# Patient Record
Sex: FEMALE | Race: WHITE | NOT HISPANIC OR LATINO
[De-identification: names, ages, dates, MRNs, and addresses within clinical notes are randomized per-mention and may not be internally consistent; named-entity substitution may affect disease eponyms.]

---

## 2019-10-21 ENCOUNTER — TRANSCRIPTION ENCOUNTER (OUTPATIENT)
Age: 29
End: 2019-10-21

## 2019-10-24 ENCOUNTER — TRANSCRIPTION ENCOUNTER (OUTPATIENT)
Age: 29
End: 2019-10-24

## 2021-08-11 ENCOUNTER — TRANSCRIPTION ENCOUNTER (OUTPATIENT)
Age: 31
End: 2021-08-11

## 2021-09-15 ENCOUNTER — TRANSCRIPTION ENCOUNTER (OUTPATIENT)
Age: 31
End: 2021-09-15

## 2022-05-10 ENCOUNTER — EMERGENCY (EMERGENCY)
Facility: HOSPITAL | Age: 32
LOS: 1 days | Discharge: HOME | End: 2022-05-10
Admitting: EMERGENCY MEDICINE
Payer: COMMERCIAL

## 2022-05-10 VITALS
HEART RATE: 98 BPM | TEMPERATURE: 98 F | OXYGEN SATURATION: 97 % | RESPIRATION RATE: 18 BRPM | SYSTOLIC BLOOD PRESSURE: 118 MMHG | DIASTOLIC BLOOD PRESSURE: 85 MMHG

## 2022-05-10 VITALS
RESPIRATION RATE: 16 BRPM | SYSTOLIC BLOOD PRESSURE: 123 MMHG | OXYGEN SATURATION: 97 % | DIASTOLIC BLOOD PRESSURE: 81 MMHG | HEART RATE: 108 BPM | HEIGHT: 67 IN | TEMPERATURE: 99 F | WEIGHT: 220.02 LBS

## 2022-05-10 LAB
ALBUMIN SERPL ELPH-MCNC: 3.8 G/DL — SIGNIFICANT CHANGE UP (ref 3.4–5)
ALP SERPL-CCNC: 76 U/L — SIGNIFICANT CHANGE UP (ref 40–120)
ALT FLD-CCNC: 93 U/L — HIGH (ref 12–42)
ANION GAP SERPL CALC-SCNC: 10 MMOL/L — SIGNIFICANT CHANGE UP (ref 9–16)
APPEARANCE UR: CLEAR — SIGNIFICANT CHANGE UP
APTT BLD: 31.4 SEC — SIGNIFICANT CHANGE UP (ref 27.5–35.5)
AST SERPL-CCNC: 37 U/L — SIGNIFICANT CHANGE UP (ref 15–37)
BACTERIA # UR AUTO: PRESENT /HPF
BASOPHILS # BLD AUTO: 0.04 K/UL — SIGNIFICANT CHANGE UP (ref 0–0.2)
BASOPHILS NFR BLD AUTO: 0.4 % — SIGNIFICANT CHANGE UP (ref 0–2)
BILIRUB SERPL-MCNC: 0.4 MG/DL — SIGNIFICANT CHANGE UP (ref 0.2–1.2)
BILIRUB UR-MCNC: NEGATIVE — SIGNIFICANT CHANGE UP
BLD GP AB SCN SERPL QL: NEGATIVE — SIGNIFICANT CHANGE UP
BUN SERPL-MCNC: 8 MG/DL — SIGNIFICANT CHANGE UP (ref 7–23)
CALCIUM SERPL-MCNC: 9.2 MG/DL — SIGNIFICANT CHANGE UP (ref 8.5–10.5)
CHLORIDE SERPL-SCNC: 103 MMOL/L — SIGNIFICANT CHANGE UP (ref 96–108)
CO2 SERPL-SCNC: 23 MMOL/L — SIGNIFICANT CHANGE UP (ref 22–31)
COLOR SPEC: YELLOW — SIGNIFICANT CHANGE UP
CREAT SERPL-MCNC: 0.79 MG/DL — SIGNIFICANT CHANGE UP (ref 0.5–1.3)
DIFF PNL FLD: ABNORMAL
EGFR: 102 ML/MIN/1.73M2 — SIGNIFICANT CHANGE UP
EOSINOPHIL # BLD AUTO: 0.16 K/UL — SIGNIFICANT CHANGE UP (ref 0–0.5)
EOSINOPHIL NFR BLD AUTO: 1.6 % — SIGNIFICANT CHANGE UP (ref 0–6)
EPI CELLS # UR: ABNORMAL /HPF (ref 0–5)
GLUCOSE SERPL-MCNC: 106 MG/DL — HIGH (ref 70–99)
GLUCOSE UR QL: NEGATIVE — SIGNIFICANT CHANGE UP
HCG SERPL-ACNC: HIGH MIU/ML
HCG UR QL: POSITIVE — SIGNIFICANT CHANGE UP
HCT VFR BLD CALC: 45 % — SIGNIFICANT CHANGE UP (ref 34.5–45)
HGB BLD-MCNC: 15.2 G/DL — SIGNIFICANT CHANGE UP (ref 11.5–15.5)
IMM GRANULOCYTES NFR BLD AUTO: 0.6 % — SIGNIFICANT CHANGE UP (ref 0–1.5)
INR BLD: 0.98 — SIGNIFICANT CHANGE UP (ref 0.88–1.16)
KETONES UR-MCNC: ABNORMAL MG/DL
LEUKOCYTE ESTERASE UR-ACNC: ABNORMAL
LYMPHOCYTES # BLD AUTO: 1.76 K/UL — SIGNIFICANT CHANGE UP (ref 1–3.3)
LYMPHOCYTES # BLD AUTO: 18.1 % — SIGNIFICANT CHANGE UP (ref 13–44)
MCHC RBC-ENTMCNC: 31 PG — SIGNIFICANT CHANGE UP (ref 27–34)
MCHC RBC-ENTMCNC: 33.8 GM/DL — SIGNIFICANT CHANGE UP (ref 32–36)
MCV RBC AUTO: 91.6 FL — SIGNIFICANT CHANGE UP (ref 80–100)
MONOCYTES # BLD AUTO: 0.76 K/UL — SIGNIFICANT CHANGE UP (ref 0–0.9)
MONOCYTES NFR BLD AUTO: 7.8 % — SIGNIFICANT CHANGE UP (ref 2–14)
NEUTROPHILS # BLD AUTO: 6.92 K/UL — SIGNIFICANT CHANGE UP (ref 1.8–7.4)
NEUTROPHILS NFR BLD AUTO: 71.5 % — SIGNIFICANT CHANGE UP (ref 43–77)
NITRITE UR-MCNC: NEGATIVE — SIGNIFICANT CHANGE UP
NRBC # BLD: 0 /100 WBCS — SIGNIFICANT CHANGE UP (ref 0–0)
PH UR: 5.5 — SIGNIFICANT CHANGE UP (ref 5–8)
PLATELET # BLD AUTO: 291 K/UL — SIGNIFICANT CHANGE UP (ref 150–400)
POTASSIUM SERPL-MCNC: 3.7 MMOL/L — SIGNIFICANT CHANGE UP (ref 3.5–5.3)
POTASSIUM SERPL-SCNC: 3.7 MMOL/L — SIGNIFICANT CHANGE UP (ref 3.5–5.3)
PROT SERPL-MCNC: 8.2 G/DL — SIGNIFICANT CHANGE UP (ref 6.4–8.2)
PROT UR-MCNC: NEGATIVE MG/DL — SIGNIFICANT CHANGE UP
PROTHROM AB SERPL-ACNC: 11.5 SEC — SIGNIFICANT CHANGE UP (ref 10.5–13.4)
RBC # BLD: 4.91 M/UL — SIGNIFICANT CHANGE UP (ref 3.8–5.2)
RBC # FLD: 13 % — SIGNIFICANT CHANGE UP (ref 10.3–14.5)
RBC CASTS # UR COMP ASSIST: < 5 /HPF — SIGNIFICANT CHANGE UP
RH IG SCN BLD-IMP: POSITIVE — SIGNIFICANT CHANGE UP
RH IG SCN BLD-IMP: POSITIVE — SIGNIFICANT CHANGE UP
SODIUM SERPL-SCNC: 136 MMOL/L — SIGNIFICANT CHANGE UP (ref 132–145)
SP GR SPEC: 1.02 — SIGNIFICANT CHANGE UP (ref 1–1.03)
UROBILINOGEN FLD QL: 0.2 E.U./DL — SIGNIFICANT CHANGE UP
WBC # BLD: 9.7 K/UL — SIGNIFICANT CHANGE UP (ref 3.8–10.5)
WBC # FLD AUTO: 9.7 K/UL — SIGNIFICANT CHANGE UP (ref 3.8–10.5)
WBC UR QL: > 10 /HPF

## 2022-05-10 PROCEDURE — 99284 EMERGENCY DEPT VISIT MOD MDM: CPT

## 2022-05-10 PROCEDURE — 76817 TRANSVAGINAL US OBSTETRIC: CPT | Mod: 26

## 2022-05-10 PROCEDURE — 76801 OB US < 14 WKS SINGLE FETUS: CPT | Mod: 26

## 2022-05-10 RX ORDER — CEPHALEXIN 500 MG
1 CAPSULE ORAL
Qty: 20 | Refills: 0
Start: 2022-05-10 | End: 2022-05-14

## 2022-05-10 RX ORDER — CEPHALEXIN 500 MG
1 CAPSULE ORAL
Qty: 28 | Refills: 0
Start: 2022-05-10 | End: 2022-05-16

## 2022-05-10 RX ORDER — CEPHALEXIN 500 MG
500 CAPSULE ORAL ONCE
Refills: 0 | Status: COMPLETED | OUTPATIENT
Start: 2022-05-10 | End: 2022-05-10

## 2022-05-10 RX ORDER — METOCLOPRAMIDE HCL 10 MG
1 TABLET ORAL
Qty: 15 | Refills: 0
Start: 2022-05-10 | End: 2022-06-08

## 2022-05-10 RX ADMIN — Medication 500 MILLIGRAM(S): at 20:26

## 2022-05-10 NOTE — ED ADULT TRIAGE NOTE - CHIEF COMPLAINT QUOTE
scant vaginal bleeding (with 1 large clot) today starting at 1700, lmp 03/30/22, unconfirmed pregnancy

## 2022-05-10 NOTE — ED PROVIDER NOTE - CARE PROVIDER_API CALL
Kala Lance)  Juliocesar [facility] Obstetrics and Gynecology  [address] [id number]  Phone: [phone]  Fax: [phone]  Follow Up Time:     Sg Naylor)  Obstetrics and Gynecology  [address], [place], [place] [address]  Phone: [phone]  Fax: [phone]  Follow Up Time:     Ranjan Llanos  OBSTETRICS AND GYNECOLOGY  [address] [place] [address], [address]  [place] [address]  Phone: [phone]  Fax: [phone]  Follow Up Time:

## 2022-05-10 NOTE — ED PROVIDER NOTE - PATIENT PORTAL LINK FT
You can access the FollowMyHealth Patient Portal offered by [facility] by registering at the following website: [email or web address]. By joining [email or web address]’s FollowMyHealth portal, you will also be able to view your health information using other applications (apps) compatible with our system.

## 2022-05-10 NOTE — ED PROVIDER NOTE - CLINICAL SUMMARY MEDICAL DECISION MAKING FREE TEXT BOX
pt presents with c/o vaginal bleeding after positive home pregnancy test. denies large amount of blood but endorses small clot. will check labs, US although pt verbalizes understanding this may be too early to confirm pregnancy. pt presents with c/o vaginal bleeding after positive home pregnancy test. denies large amount of blood but endorses small clot. will check labs, US although pt verbalizes understanding this may be too early to confirm pregnancy.     signed out to night team pending hcg and US.

## 2022-05-10 NOTE — ED PROVIDER NOTE - CONSTITUTIONAL, MLM
normal... Well appearing, awake, alert, oriented to person, place, time/situation, anxious appearing, tearful

## 2022-05-10 NOTE — ED PROVIDER NOTE - OBJECTIVE STATEMENT
31yo F presents with c/o vaginal bleeding in pregnancy. LMP 3/30/22 with positive home pregnancy 10 days ago, several positive home tests. she has had slight spotting twice in the last two weeks and then today she started having heavier bleeding and passed a clot the size of a coffee bean. denies abd pain, dizziness, nausea, vomiting, chest pain, sob, headache, vision changes, any other concerning symptoms.

## 2022-05-10 NOTE — ED PROVIDER NOTE - CARE PLAN
1 Principal Discharge DX:	Threatened miscarriage  Secondary Diagnosis:	Acute UTI   Principal Discharge DX:	Threatened miscarriage  Secondary Diagnosis:	Acute UTI  Secondary Diagnosis:	Endometrioma of ovary

## 2022-05-10 NOTE — ED PROVIDER NOTE - CARE PROVIDERS DIRECT ADDRESSES
[email or web address]

## 2022-05-10 NOTE — ED PROVIDER NOTE - NSCAREINITIATED _GEN_ER
Per LOV note 5/24/17 pt was to be cleared by EP prior to starting Humira.  Nothing in EP notes on 5/30/17 to reflect clearance.  LM for pt to call back  Med set to escribed to pharmacy   -ACP Only-, .(Attending)

## 2022-05-10 NOTE — ED PROVIDER NOTE - NSFOLLOWUPINSTRUCTIONS_ED_ALL_ED_FT
Threatened Miscarriage    WHAT YOU NEED TO KNOW:    A threatened miscarriage occurs when you have vaginal bleeding within the first 20 weeks of pregnancy. It means that a miscarriage may happen. A threatened miscarriage may also be called a threatened .    DISCHARGE INSTRUCTIONS:    Seek care immediately if:   •You feel weak or faint.      •Your pain or cramping in your abdomen or back gets worse.      •You have vaginal bleeding that soaks 1 or more pads in an hour.      •You pass material that looks like tissue or large clots.      Call your doctor or obstetrician if:   •You have a fever.      •You have trouble urinating, burning when you urinate, or feel a need to urinate often.      •You have new or worsening vaginal bleeding.      •You have vaginal pain or itching, or vaginal discharge that is yellow, green, or foul-smelling.      •You have questions or concerns about your condition or care.      Self-care: The following may help you manage your symptoms and decrease your risk for a miscarriage:  •Do not put anything in your vagina. Do not have sex, douche, or use tampons. These actions may increase your risk for infection and miscarriage.      •Rest as directed. Do not exercise or do strenuous activities. These activities may cause  labor or miscarriage. Ask your healthcare provider what activities are okay to do.      Stay healthy during pregnancy:   •Eat a variety of healthy foods. Healthy foods can help you get extra protein, water, and calories that you need while you are pregnant. Healthy foods include fruits, vegetables, whole-grain breads, low-fat dairy products, beans, lean meats, and fish. Avoid raw or undercooked meat and fish. Ask your healthcare provider if you need a special diet.  Healthy Foods           •Take prenatal vitamins as directed. These help you get the right amount of vitamins and minerals. They may also decrease the risk of certain birth defects.      •Do not drink alcohol or use illegal drugs. These can increase your risk for a miscarriage or harm your baby.      •Do not smoke. Nicotine and other chemicals in cigarettes and cigars can harm your baby and cause miscarriage or  labor. Ask your healthcare provider for information if you currently smoke and need help to quit. E-cigarettes or smokeless tobacco still contain nicotine. Do not use these products.      •Decrease your risk for an infection. Always wash your hands before eating or preparing meals. Do not spend time with people who are sick. Ask your healthcare provider if you need immunizations such as the flu or hepatitis B vaccine. Immunizations may decrease your risk for infections that could cause a miscarriage.      •Manage your medical conditions. Keep your blood pressure and blood sugars under control. Maintain a healthy weight during pregnancy.      Follow up with your doctor or obstetrician as directed: You may need to see your obstetrician frequently for ultrasounds or blood tests. Write down your questions so you remember to ask them during your visits.      Ovarian Cyst    WHAT YOU NEED TO KNOW:    An ovarian cyst is a fluid-filled sac that grows in or on an ovary. You have 2 ovaries, 1 on each side of your uterus. They are small, about the shape of an almond. Ovarian cysts are common in women who have regular monthly cycles. During your monthly cycle, eggs are released from the ovaries. The cyst usually contains fluid but may sometimes have blood or tissue in it. Most ovarian cysts are harmless and go away without treatment in a few months. Some cysts can grow large, cause pain, or break open.   Female Reproductive System         DISCHARGE INSTRUCTIONS:    Call your local emergency number (911 in the ) if:   •You have severe pain with fever and vomiting.      •You have sudden, severe abdominal pain.      •You are too weak, faint, or dizzy to stand up.      •You are breathing very quickly.      Call your doctor or gynecologist if:   •Your periods are early, late, or more painful than usual.      •You have questions or concerns about your condition or care.      Medicines: You may need any of the following:   •Birth control pills may help control your monthly cycle, prevent cysts, or cause them to shrink.      •Acetaminophen decreases pain and fever. It is available without a doctor's order. Ask how much to take and how often to take it. Follow directions. Read the labels of all other medicines you are using to see if they also contain acetaminophen, or ask your doctor or pharmacist. Acetaminophen can cause liver damage if not taken correctly.       •NSAIDs, such as ibuprofen, help decrease swelling, pain, and fever. This medicine is available with or without a doctor's order. NSAIDs can cause stomach bleeding or kidney problems in certain people. If you take blood thinner medicine, always ask your healthcare provider if NSAIDs are safe for you. Always read the medicine label and follow directions.      •Prescription pain medicine may be given. Ask your healthcare provider how to take this medicine safely. Some prescription pain medicines contain acetaminophen. Do not take other medicines that contain acetaminophen without talking to your healthcare provider. Too much acetaminophen may cause liver damage. Prescription pain medicine may cause constipation. Ask your healthcare provider how to prevent or treat constipation.       •Take your medicine as directed. Contact your healthcare provider if you think your medicine is not helping or if you have side effects. Tell him or her if you are allergic to any medicine. Keep a list of the medicines, vitamins, and herbs you take. Include the amounts, and when and why you take them. Bring the list or the pill bottles to follow-up visits. Carry your medicine list with you in case of an emergency.      Manage ovarian cysts: You can manage a current cyst and help healthcare providers find future cysts early.  •Apply heat to decrease pain and cramping from a cyst. Sit in a warm bath, or place a heating pad (turned on low) on your abdomen. Do this for 15 to 20 minutes every hour for comfort.      •Get regular pelvic exams or Pap smears. This will help providers find any new ovarian cysts. Tell your healthcare provider about any unusual changes in your monthly cycle.      Follow up with your doctor or gynecologist as directed: Write down your questions so you remember to ask them during your visits.

## 2022-05-10 NOTE — ED PROVIDER NOTE - PROGRESS NOTE DETAILS
received s/o from ANNELIESE Diaz pending US and beta HCG.  US reveals Single intrauterine gestation with average ultrasound age of 6 weeks 0 days. Fetal heart rate is not visualized likely due to early gestational age. Incidental finding of 9.2 cm complex cystic mass within the right ovary, most consistent with an endometrioma. +normal flow to b/l ovaries. Pt reassessed at bedside, abd soft and NT, never had any abd or pelvic pain reported, minimal vaginal spotting currently.  AFVSS and pt non-toxic appearing, results, ddx, and f/u plans discussed in length with pt and partner at bedside, d/c'd home to close f/u with OB (private OB appt in 9 days, but also given earlier f/u with [facility]), threatened AB precautions and strict return precautions discussed, prompt return to ER for any worsening or new sx, pt verbalized understanding.

## 2022-05-10 NOTE — ED PROVIDER NOTE - PROVIDER TOKENS
PROVIDER:[TOKEN:[[id number]]],PROVIDER:[TOKEN:[[id number]]],PROVIDER:[TOKEN:[[id number]]]

## 2022-05-11 PROBLEM — Z00.00 ENCOUNTER FOR PREVENTIVE HEALTH EXAMINATION: Status: ACTIVE | Noted: 2022-05-11

## 2022-05-12 ENCOUNTER — NON-APPOINTMENT (OUTPATIENT)
Age: 32
End: 2022-05-12

## 2022-05-12 DIAGNOSIS — O23.41 UNSPECIFIED INFECTION OF URINARY TRACT IN PREGNANCY, FIRST TRIMESTER: ICD-10-CM

## 2022-05-12 DIAGNOSIS — O20.9 HEMORRHAGE IN EARLY PREGNANCY, UNSPECIFIED: ICD-10-CM

## 2022-05-12 DIAGNOSIS — Z3A.01 LESS THAN 8 WEEKS GESTATION OF PREGNANCY: ICD-10-CM

## 2022-05-12 DIAGNOSIS — O20.0 THREATENED ABORTION: ICD-10-CM

## 2022-05-12 LAB
CULTURE RESULTS: SIGNIFICANT CHANGE UP
SPECIMEN SOURCE: SIGNIFICANT CHANGE UP

## 2022-05-16 ENCOUNTER — APPOINTMENT (OUTPATIENT)
Dept: OBGYN | Facility: CLINIC | Age: 32
End: 2022-05-16
Payer: COMMERCIAL

## 2022-05-16 ENCOUNTER — TRANSCRIPTION ENCOUNTER (OUTPATIENT)
Age: 32
End: 2022-05-16

## 2022-05-16 VITALS
BODY MASS INDEX: 34.69 KG/M2 | SYSTOLIC BLOOD PRESSURE: 113 MMHG | OXYGEN SATURATION: 97 % | HEIGHT: 67 IN | DIASTOLIC BLOOD PRESSURE: 78 MMHG | WEIGHT: 221 LBS | HEART RATE: 121 BPM

## 2022-05-16 PROBLEM — Z78.9 OTHER SPECIFIED HEALTH STATUS: Chronic | Status: ACTIVE | Noted: 2022-05-10

## 2022-05-16 PROCEDURE — 76830 TRANSVAGINAL US NON-OB: CPT

## 2022-05-16 PROCEDURE — 99203 OFFICE O/P NEW LOW 30 MIN: CPT

## 2022-05-17 LAB
ABO + RH PNL BLD: NORMAL
BASOPHILS # BLD AUTO: 0.04 K/UL
BASOPHILS NFR BLD AUTO: 0.5 %
BLD GP AB SCN SERPL QL: NORMAL
EOSINOPHIL # BLD AUTO: 0.12 K/UL
EOSINOPHIL NFR BLD AUTO: 1.5 %
HCG SERPL-MCNC: ABNORMAL MIU/ML
HCT VFR BLD CALC: 47.5 %
HGB BLD-MCNC: 14.8 G/DL
IMM GRANULOCYTES NFR BLD AUTO: 0.6 %
LYMPHOCYTES # BLD AUTO: 1.53 K/UL
LYMPHOCYTES NFR BLD AUTO: 18.9 %
MAN DIFF?: NORMAL
MCHC RBC-ENTMCNC: 29.9 PG
MCHC RBC-ENTMCNC: 31.2 GM/DL
MCV RBC AUTO: 96 FL
MONOCYTES # BLD AUTO: 0.64 K/UL
MONOCYTES NFR BLD AUTO: 7.9 %
NEUTROPHILS # BLD AUTO: 5.7 K/UL
NEUTROPHILS NFR BLD AUTO: 70.6 %
PLATELET # BLD AUTO: 295 K/UL
PROGEST SERPL-MCNC: 11 NG/ML
RBC # BLD: 4.95 M/UL
RBC # FLD: 13.9 %
WBC # FLD AUTO: 8.08 K/UL

## 2022-05-17 NOTE — HISTORY OF PRESENT ILLNESS
[Normal Amount/Duration] :  normal amount and duration [Regular Cycle Intervals] : periods have been regular [Menarche Age: ____] : age at menarche was [unfilled] [Menstrual Cramps] : menstrual cramps [Currently Active] : currently active [Men] : men [Vaginal] : vaginal [No] : No [FreeTextEntry1] : 03/30/2022

## 2022-05-17 NOTE — PHYSICAL EXAM
[Chaperone Present] : A chaperone was present in the examining room during all aspects of the physical examination [Appropriately responsive] : appropriately responsive [Alert] : alert [No Acute Distress] : no acute distress [Soft] : soft [Non-tender] : non-tender [No Lesions] : no lesions [Labia Majora] : normal [Labia Minora] : normal [Normal] : normal

## 2022-05-17 NOTE — PROCEDURE
[Transvaginal OB Sonogram] : Transvaginal OB Sonogram [Intrauterine Pregnancy] : intrauterine pregnancy [Yolk Sac] : yolk sac present [Fetal Heart] : no fetal heart [FreeTextEntry1] : Yolk sac present but no FH; measures appropriate for dates \par Large right complex appearing ovarian cyst noted- ~ 9 cm x 7 cm

## 2022-05-17 NOTE — COUNSELING
[Vitamins/Supplements] : vitamins/supplements [Drugs/Alcohol] : drugs, alcohol [Lab Results] : lab results [Medication Management] : medication management

## 2022-05-17 NOTE — REVIEW OF SYSTEMS
[Fatigue] : fatigue [Vomiting] : vomiting [Nausea] : nausea [Breast Pain] : breast pain [Negative] : Heme/Lymph

## 2022-05-17 NOTE — PLAN
[FreeTextEntry1] : Ms. LILLIAN ADAIR is a 33 yo G1 at 6 weeks 5d who  presents for confirmation of pregnancy. \par - IUP visualized with yolk sac and no FH\par - Patient counseled on possibility of early pregnancy versus failing pregnancy \par - Patient to return in 1 week for repeat US to assess for FH \par -  Patient counseled to avoid alcohol and risk associated foods.  \par - Patient counseled to continue taking a prenatal vitamin. \par - HCG and progesterone sent today \par \par Plan of care discussed with patient. She is in agreement and has no additional questions or needs at this  time. \par \par

## 2022-05-23 ENCOUNTER — APPOINTMENT (OUTPATIENT)
Dept: OBGYN | Facility: CLINIC | Age: 32
End: 2022-05-23
Payer: COMMERCIAL

## 2022-05-23 ENCOUNTER — NON-APPOINTMENT (OUTPATIENT)
Age: 32
End: 2022-05-23

## 2022-05-23 VITALS
OXYGEN SATURATION: 98 % | HEART RATE: 114 BPM | SYSTOLIC BLOOD PRESSURE: 116 MMHG | WEIGHT: 222 LBS | DIASTOLIC BLOOD PRESSURE: 80 MMHG | HEIGHT: 67 IN | BODY MASS INDEX: 34.84 KG/M2

## 2022-05-23 PROCEDURE — 0502F SUBSEQUENT PRENATAL CARE: CPT

## 2022-05-23 PROCEDURE — 36415 COLL VENOUS BLD VENIPUNCTURE: CPT

## 2022-05-24 ENCOUNTER — NON-APPOINTMENT (OUTPATIENT)
Age: 32
End: 2022-05-24

## 2022-05-24 LAB
ABO + RH PNL BLD: NORMAL
ALBUMIN SERPL ELPH-MCNC: 4.8 G/DL
ALP BLD-CCNC: 85 U/L
ALT SERPL-CCNC: 33 U/L
ANION GAP SERPL CALC-SCNC: 18 MMOL/L
AST SERPL-CCNC: 25 U/L
B19V IGG SER QL IA: 0.5 INDEX
B19V IGG+IGM SER-IMP: NEGATIVE
B19V IGG+IGM SER-IMP: NORMAL
B19V IGM FLD-ACNC: 0.1 INDEX
B19V IGM SER-ACNC: NEGATIVE
BASOPHILS # BLD AUTO: 0.08 K/UL
BASOPHILS NFR BLD AUTO: 0.7 %
BILIRUB SERPL-MCNC: 0.3 MG/DL
BLD GP AB SCN SERPL QL: NORMAL
BUN SERPL-MCNC: 9 MG/DL
C TRACH RRNA SPEC QL NAA+PROBE: NOT DETECTED
CALCIUM SERPL-MCNC: 9.9 MG/DL
CHLORIDE SERPL-SCNC: 100 MMOL/L
CMV IGG SERPL QL: 5.9 U/ML
CMV IGG SERPL-IMP: POSITIVE
CMV IGM SERPL QL: <8 AU/ML
CMV IGM SERPL QL: NEGATIVE
CO2 SERPL-SCNC: 20 MMOL/L
CREAT SERPL-MCNC: 0.67 MG/DL
EGFR: 119 ML/MIN/1.73M2
EOSINOPHIL # BLD AUTO: 0.16 K/UL
EOSINOPHIL NFR BLD AUTO: 1.4 %
GLUCOSE SERPL-MCNC: 66 MG/DL
HCT VFR BLD CALC: 48.5 %
HGB A MFR BLD: 97.6 %
HGB A2 MFR BLD: 2.4 %
HGB BLD-MCNC: 15.8 G/DL
HGB FRACT BLD-IMP: NORMAL
HSV 1+2 IGG SER IA-IMP: NEGATIVE
HSV 1+2 IGG SER IA-IMP: POSITIVE
HSV1 IGG SER QL: 27.6 INDEX
HSV2 IGG SER QL: 0.13 INDEX
IMM GRANULOCYTES NFR BLD AUTO: 1 %
LYMPHOCYTES # BLD AUTO: 1.98 K/UL
LYMPHOCYTES NFR BLD AUTO: 16.8 %
MAN DIFF?: NORMAL
MCHC RBC-ENTMCNC: 30.6 PG
MCHC RBC-ENTMCNC: 32.6 GM/DL
MCV RBC AUTO: 93.8 FL
MEV IGG FLD QL IA: 41.4 AU/ML
MEV IGG+IGM SER-IMP: POSITIVE
MONOCYTES # BLD AUTO: 0.87 K/UL
MONOCYTES NFR BLD AUTO: 7.4 %
MUV AB SER-ACNC: POSITIVE
MUV IGG SER QL IA: 17.7 AU/ML
N GONORRHOEA RRNA SPEC QL NAA+PROBE: NOT DETECTED
NEUTROPHILS # BLD AUTO: 8.59 K/UL
NEUTROPHILS NFR BLD AUTO: 72.7 %
PLATELET # BLD AUTO: 314 K/UL
POTASSIUM SERPL-SCNC: 4.1 MMOL/L
PROT SERPL-MCNC: 7.9 G/DL
RBC # BLD: 5.17 M/UL
RBC # FLD: 13.3 %
RUBV IGG FLD-ACNC: 2.8 INDEX
RUBV IGG SER-IMP: POSITIVE
SODIUM SERPL-SCNC: 139 MMOL/L
SOURCE AMPLIFICATION: NORMAL
T GONDII AB SER-IMP: NEGATIVE
T GONDII AB SER-IMP: NEGATIVE
T GONDII IGG SER QL: <3 IU/ML
T GONDII IGM SER QL: <3 AU/ML
TSH SERPL-ACNC: 1.65 UIU/ML
VZV AB TITR SER: POSITIVE
VZV IGG SER IF-ACNC: 2870 INDEX
WBC # FLD AUTO: 11.8 K/UL

## 2022-05-24 NOTE — HISTORY OF PRESENT ILLNESS
[FreeTextEntry1] : Ms. Angelika Yo presents to office for follow up evaluation of pregnancy at 7 weeks 5 days. \par Patient denies any pelvic cramping but does report she had one time light spotting this morning.\par \par Patient reports nausea in which Vitamin B6 and Unisom is helping. \par \par LMP: 03/30/2022\par EGA: 7w5d\par HAMZAH: 12/07/2022

## 2022-05-24 NOTE — PROCEDURE
[Transvaginal OB Sonogram] : Transvaginal OB Sonogram [Intrauterine Pregnancy] : intrauterine pregnancy [Fetal Heart] : fetal heart present [CRL: ___ (mm)] : CRL - [unfilled]Umm [Current GA by Sonogram: ___ (wks)] : Current GA by Sonogram: [unfilled]Uwks [___ day(s)] : [unfilled] days [Transvaginal OB Sonogram WNL] : Transvaginal OB Sonogram WNL [FreeTextEntry1] : FHR: 155

## 2022-05-24 NOTE — PLAN
[FreeTextEntry1] : Pregnancy packet reviewed as well as instructions for MFM follow up. Patient to RTO in 3 weeks for NIPT. \par \par Patient to be contacted PRN regarding results and has been instructed to contact office with any vaginal bleeding and/or pelvic pain.\par \par All questions answered and patient is in agreement and understanding of plan.

## 2022-05-24 NOTE — PHYSICAL EXAM
OB [Chaperone Present] : A chaperone was present in the examining room during all aspects of the physical examination [Appropriately responsive] : appropriately responsive [Alert] : alert [No Acute Distress] : no acute distress [Oriented x3] : oriented x3 [Labia Majora] : normal [Labia Minora] : normal [Normal] : normal

## 2022-05-25 LAB
BACTERIA UR CULT: NORMAL
HBV SURFACE AG SER QL: NONREACTIVE
HCV AB SER QL: NONREACTIVE
HCV S/CO RATIO: 0.09 S/CO
HIV1+2 AB SPEC QL IA.RAPID: NONREACTIVE
RPR SER-TITR: NORMAL

## 2022-05-27 ENCOUNTER — NON-APPOINTMENT (OUTPATIENT)
Age: 32
End: 2022-05-27

## 2022-05-27 LAB — AR GENE MUT ANL BLD/T: NORMAL

## 2022-06-01 LAB — FMR1 GENE MUT ANL BLD/T: NORMAL

## 2022-06-02 ENCOUNTER — TRANSCRIPTION ENCOUNTER (OUTPATIENT)
Age: 32
End: 2022-06-02

## 2022-06-02 LAB
CFTR MUT TESTED BLD/T: NEGATIVE
HSV1 IGM SER QL: NEGATIVE
HSV2 AB FLD-ACNC: NEGATIVE

## 2022-06-06 ENCOUNTER — TRANSCRIPTION ENCOUNTER (OUTPATIENT)
Age: 32
End: 2022-06-06

## 2022-06-06 ENCOUNTER — NON-APPOINTMENT (OUTPATIENT)
Age: 32
End: 2022-06-06

## 2022-06-07 ENCOUNTER — TRANSCRIPTION ENCOUNTER (OUTPATIENT)
Age: 32
End: 2022-06-07

## 2022-06-13 ENCOUNTER — NON-APPOINTMENT (OUTPATIENT)
Age: 32
End: 2022-06-13

## 2022-06-13 ENCOUNTER — APPOINTMENT (OUTPATIENT)
Dept: OBGYN | Facility: CLINIC | Age: 32
End: 2022-06-13
Payer: COMMERCIAL

## 2022-06-13 VITALS — WEIGHT: 224 LBS | BODY MASS INDEX: 35.08 KG/M2 | DIASTOLIC BLOOD PRESSURE: 80 MMHG | SYSTOLIC BLOOD PRESSURE: 110 MMHG

## 2022-06-13 PROCEDURE — 0502F SUBSEQUENT PRENATAL CARE: CPT

## 2022-06-20 ENCOUNTER — TRANSCRIPTION ENCOUNTER (OUTPATIENT)
Age: 32
End: 2022-06-20

## 2022-06-21 ENCOUNTER — LABORATORY RESULT (OUTPATIENT)
Age: 32
End: 2022-06-21

## 2022-06-22 ENCOUNTER — APPOINTMENT (OUTPATIENT)
Dept: ANTEPARTUM | Facility: CLINIC | Age: 32
End: 2022-06-22
Payer: COMMERCIAL

## 2022-06-22 ENCOUNTER — ASOB RESULT (OUTPATIENT)
Age: 32
End: 2022-06-22

## 2022-06-22 PROCEDURE — 76801 OB US < 14 WKS SINGLE FETUS: CPT

## 2022-06-22 PROCEDURE — 76813 OB US NUCHAL MEAS 1 GEST: CPT

## 2022-06-23 ENCOUNTER — TRANSCRIPTION ENCOUNTER (OUTPATIENT)
Age: 32
End: 2022-06-23

## 2022-06-23 RX ORDER — ONDANSETRON 4 MG/1
4 TABLET ORAL 3 TIMES DAILY
Qty: 2 | Refills: 1 | Status: DISCONTINUED | COMMUNITY
Start: 2022-06-07 | End: 2022-06-23

## 2022-07-02 ENCOUNTER — NON-APPOINTMENT (OUTPATIENT)
Age: 32
End: 2022-07-02

## 2022-07-06 ENCOUNTER — TRANSCRIPTION ENCOUNTER (OUTPATIENT)
Age: 32
End: 2022-07-06

## 2022-07-11 ENCOUNTER — TRANSCRIPTION ENCOUNTER (OUTPATIENT)
Age: 32
End: 2022-07-11

## 2022-07-18 ENCOUNTER — APPOINTMENT (OUTPATIENT)
Dept: OBGYN | Facility: CLINIC | Age: 32
End: 2022-07-18

## 2022-07-18 VITALS
WEIGHT: 224 LBS | HEART RATE: 118 BPM | OXYGEN SATURATION: 97 % | SYSTOLIC BLOOD PRESSURE: 127 MMHG | DIASTOLIC BLOOD PRESSURE: 82 MMHG | BODY MASS INDEX: 35.08 KG/M2

## 2022-07-18 PROCEDURE — 0502F SUBSEQUENT PRENATAL CARE: CPT

## 2022-07-18 PROCEDURE — 36415 COLL VENOUS BLD VENIPUNCTURE: CPT

## 2022-07-20 ENCOUNTER — ASOB RESULT (OUTPATIENT)
Age: 32
End: 2022-07-20

## 2022-07-20 ENCOUNTER — APPOINTMENT (OUTPATIENT)
Dept: ANTEPARTUM | Facility: CLINIC | Age: 32
End: 2022-07-20

## 2022-07-20 PROCEDURE — 76805 OB US >/= 14 WKS SNGL FETUS: CPT

## 2022-07-20 PROCEDURE — 76817 TRANSVAGINAL US OBSTETRIC: CPT

## 2022-07-27 LAB — AFP PNL SERPL: NORMAL

## 2022-08-02 ENCOUNTER — NON-APPOINTMENT (OUTPATIENT)
Age: 32
End: 2022-08-02

## 2022-08-02 ENCOUNTER — TRANSCRIPTION ENCOUNTER (OUTPATIENT)
Age: 32
End: 2022-08-02

## 2022-08-09 ENCOUNTER — TRANSCRIPTION ENCOUNTER (OUTPATIENT)
Age: 32
End: 2022-08-09

## 2022-08-15 ENCOUNTER — APPOINTMENT (OUTPATIENT)
Dept: OBGYN | Facility: CLINIC | Age: 32
End: 2022-08-15

## 2022-08-15 VITALS
HEART RATE: 104 BPM | BODY MASS INDEX: 34.69 KG/M2 | OXYGEN SATURATION: 96 % | HEIGHT: 67 IN | DIASTOLIC BLOOD PRESSURE: 82 MMHG | WEIGHT: 221 LBS | SYSTOLIC BLOOD PRESSURE: 124 MMHG

## 2022-08-15 PROCEDURE — 0502F SUBSEQUENT PRENATAL CARE: CPT

## 2022-08-17 ENCOUNTER — TRANSCRIPTION ENCOUNTER (OUTPATIENT)
Age: 32
End: 2022-08-17

## 2022-08-24 ENCOUNTER — APPOINTMENT (OUTPATIENT)
Dept: ANTEPARTUM | Facility: CLINIC | Age: 32
End: 2022-08-24

## 2022-08-24 ENCOUNTER — TRANSCRIPTION ENCOUNTER (OUTPATIENT)
Age: 32
End: 2022-08-24

## 2022-08-24 ENCOUNTER — ASOB RESULT (OUTPATIENT)
Age: 32
End: 2022-08-24

## 2022-08-24 PROCEDURE — 76816 OB US FOLLOW-UP PER FETUS: CPT

## 2022-08-31 ENCOUNTER — TRANSCRIPTION ENCOUNTER (OUTPATIENT)
Age: 32
End: 2022-08-31

## 2022-08-31 ENCOUNTER — APPOINTMENT (OUTPATIENT)
Dept: ANTEPARTUM | Facility: CLINIC | Age: 32
End: 2022-08-31

## 2022-08-31 ENCOUNTER — ASOB RESULT (OUTPATIENT)
Age: 32
End: 2022-08-31

## 2022-08-31 PROCEDURE — 76815 OB US LIMITED FETUS(S): CPT

## 2022-09-06 ENCOUNTER — OUTPATIENT (OUTPATIENT)
Dept: OUTPATIENT SERVICES | Facility: HOSPITAL | Age: 32
LOS: 1 days | Discharge: HOME | End: 2022-09-06

## 2022-09-06 ENCOUNTER — NON-APPOINTMENT (OUTPATIENT)
Age: 32
End: 2022-09-06

## 2022-09-06 ENCOUNTER — APPOINTMENT (OUTPATIENT)
Dept: PSYCHIATRY | Facility: CLINIC | Age: 32
End: 2022-09-06

## 2022-09-06 PROCEDURE — 99205 OFFICE O/P NEW HI 60 MIN: CPT | Mod: 95

## 2022-09-12 ENCOUNTER — APPOINTMENT (OUTPATIENT)
Dept: PSYCHIATRY | Facility: CLINIC | Age: 32
End: 2022-09-12

## 2022-09-14 ENCOUNTER — TRANSCRIPTION ENCOUNTER (OUTPATIENT)
Age: 32
End: 2022-09-14

## 2022-09-14 ENCOUNTER — APPOINTMENT (OUTPATIENT)
Dept: PSYCHIATRY | Facility: CLINIC | Age: 32
End: 2022-09-14

## 2022-09-14 PROCEDURE — 99215 OFFICE O/P EST HI 40 MIN: CPT | Mod: 95

## 2022-09-16 ENCOUNTER — NON-APPOINTMENT (OUTPATIENT)
Age: 32
End: 2022-09-16

## 2022-09-19 ENCOUNTER — APPOINTMENT (OUTPATIENT)
Dept: OBGYN | Facility: CLINIC | Age: 32
End: 2022-09-19
Payer: COMMERCIAL

## 2022-09-19 VITALS
SYSTOLIC BLOOD PRESSURE: 123 MMHG | OXYGEN SATURATION: 96 % | HEIGHT: 67 IN | DIASTOLIC BLOOD PRESSURE: 84 MMHG | HEART RATE: 101 BPM | BODY MASS INDEX: 34.69 KG/M2 | WEIGHT: 221 LBS

## 2022-09-19 DIAGNOSIS — Z23 ENCOUNTER FOR IMMUNIZATION: ICD-10-CM

## 2022-09-19 PROCEDURE — 59425 ANTEPARTUM CARE ONLY: CPT

## 2022-09-19 PROCEDURE — 0502F SUBSEQUENT PRENATAL CARE: CPT

## 2022-09-20 ENCOUNTER — APPOINTMENT (OUTPATIENT)
Dept: PSYCHIATRY | Facility: CLINIC | Age: 32
End: 2022-09-20

## 2022-09-21 LAB — BACTERIA UR CULT: NORMAL

## 2022-09-27 ENCOUNTER — APPOINTMENT (OUTPATIENT)
Dept: PSYCHIATRY | Facility: CLINIC | Age: 32
End: 2022-09-27

## 2022-09-28 ENCOUNTER — APPOINTMENT (OUTPATIENT)
Dept: OBGYN | Facility: CLINIC | Age: 32
End: 2022-09-28

## 2022-09-28 PROCEDURE — 36415 COLL VENOUS BLD VENIPUNCTURE: CPT

## 2022-10-03 ENCOUNTER — NON-APPOINTMENT (OUTPATIENT)
Age: 32
End: 2022-10-03

## 2022-10-03 ENCOUNTER — TRANSCRIPTION ENCOUNTER (OUTPATIENT)
Age: 32
End: 2022-10-03

## 2022-10-03 LAB
APPEARANCE: CLEAR
BACTERIA UR CULT: NORMAL
BASOPHILS # BLD AUTO: 0.03 K/UL
BASOPHILS NFR BLD AUTO: 0.3 %
BILIRUBIN URINE: NEGATIVE
BLOOD URINE: NEGATIVE
COLOR: NORMAL
EOSINOPHIL # BLD AUTO: 0.11 K/UL
EOSINOPHIL NFR BLD AUTO: 1 %
GLUCOSE 1H P 50 G GLC PO SERPL-MCNC: 149 MG/DL
GLUCOSE QUALITATIVE U: ABNORMAL
HCT VFR BLD CALC: 39.4 %
HGB BLD-MCNC: 12.5 G/DL
IMM GRANULOCYTES NFR BLD AUTO: 0.9 %
KETONES URINE: NEGATIVE
LEUKOCYTE ESTERASE URINE: NEGATIVE
LYMPHOCYTES # BLD AUTO: 1.71 K/UL
LYMPHOCYTES NFR BLD AUTO: 15.2 %
MAN DIFF?: NORMAL
MCHC RBC-ENTMCNC: 29.5 PG
MCHC RBC-ENTMCNC: 31.7 GM/DL
MCV RBC AUTO: 92.9 FL
MONOCYTES # BLD AUTO: 0.58 K/UL
MONOCYTES NFR BLD AUTO: 5.1 %
NEUTROPHILS # BLD AUTO: 8.75 K/UL
NEUTROPHILS NFR BLD AUTO: 77.5 %
NITRITE URINE: NEGATIVE
PH URINE: 7.5
PLATELET # BLD AUTO: 366 K/UL
PROTEIN URINE: NEGATIVE
RBC # BLD: 4.24 M/UL
RBC # FLD: 14.1 %
SPECIFIC GRAVITY URINE: 1.01
T PALLIDUM AB SER QL IA: NEGATIVE
UROBILINOGEN URINE: NORMAL
WBC # FLD AUTO: 11.28 K/UL

## 2022-10-03 RX ORDER — ADHESIVE TAPE 3"X 2.3 YD
2"X2" TAPE, NON-MEDICATED TOPICAL
Qty: 3 | Refills: 4 | Status: ACTIVE | COMMUNITY
Start: 2022-10-03 | End: 1900-01-01

## 2022-10-03 RX ORDER — CONTAINER,EMPTY
EACH MISCELLANEOUS
Qty: 1 | Refills: 0 | Status: ACTIVE | COMMUNITY
Start: 2022-10-03 | End: 1900-01-01

## 2022-10-03 RX ORDER — BLOOD SUGAR DIAGNOSTIC
STRIP MISCELLANEOUS 4 TIMES DAILY
Qty: 1 | Refills: 2 | Status: ACTIVE | COMMUNITY
Start: 2022-10-03 | End: 1900-01-01

## 2022-10-03 RX ORDER — 70%ISOPROPYL ALCOHOL 0.7 ML/ML
70 SWAB TOPICAL
Qty: 1 | Refills: 0 | Status: ACTIVE | COMMUNITY
Start: 2022-10-03 | End: 1900-01-01

## 2022-10-03 RX ORDER — LANCETS
EACH MISCELLANEOUS
Qty: 1 | Refills: 2 | Status: ACTIVE | COMMUNITY
Start: 2022-10-03 | End: 1900-01-01

## 2022-10-03 RX ORDER — BLOOD-GLUCOSE METER
W/DEVICE EACH MISCELLANEOUS
Qty: 1 | Refills: 0 | Status: ACTIVE | COMMUNITY
Start: 2022-10-03 | End: 1900-01-01

## 2022-10-04 ENCOUNTER — APPOINTMENT (OUTPATIENT)
Dept: PSYCHIATRY | Facility: CLINIC | Age: 32
End: 2022-10-04

## 2022-10-04 ENCOUNTER — TRANSCRIPTION ENCOUNTER (OUTPATIENT)
Age: 32
End: 2022-10-04

## 2022-10-04 ENCOUNTER — NON-APPOINTMENT (OUTPATIENT)
Age: 32
End: 2022-10-04

## 2022-10-06 ENCOUNTER — TRANSCRIPTION ENCOUNTER (OUTPATIENT)
Age: 32
End: 2022-10-06

## 2022-10-10 ENCOUNTER — APPOINTMENT (OUTPATIENT)
Dept: OBGYN | Facility: CLINIC | Age: 32
End: 2022-10-10

## 2022-10-10 VITALS
SYSTOLIC BLOOD PRESSURE: 118 MMHG | WEIGHT: 220 LBS | DIASTOLIC BLOOD PRESSURE: 84 MMHG | OXYGEN SATURATION: 96 % | HEART RATE: 123 BPM | BODY MASS INDEX: 34.46 KG/M2

## 2022-10-10 PROCEDURE — 0502F SUBSEQUENT PRENATAL CARE: CPT

## 2022-10-10 RX ORDER — PROMETHAZINE HYDROCHLORIDE 12.5 MG/1
12.5 SUPPOSITORY RECTAL EVERY 6 HOURS
Qty: 120 | Refills: 0 | Status: DISCONTINUED | COMMUNITY
Start: 2022-09-20 | End: 2022-10-10

## 2022-10-11 ENCOUNTER — APPOINTMENT (OUTPATIENT)
Dept: MATERNAL FETAL MEDICINE | Facility: CLINIC | Age: 32
End: 2022-10-11

## 2022-10-11 ENCOUNTER — APPOINTMENT (OUTPATIENT)
Dept: PSYCHIATRY | Facility: CLINIC | Age: 32
End: 2022-10-11

## 2022-10-11 ENCOUNTER — NON-APPOINTMENT (OUTPATIENT)
Age: 32
End: 2022-10-11

## 2022-10-11 VITALS
HEIGHT: 67 IN | OXYGEN SATURATION: 97 % | BODY MASS INDEX: 34.69 KG/M2 | DIASTOLIC BLOOD PRESSURE: 76 MMHG | SYSTOLIC BLOOD PRESSURE: 111 MMHG | HEART RATE: 104 BPM | WEIGHT: 221 LBS

## 2022-10-11 DIAGNOSIS — F34.1 DYSTHYMIC DISORDER: ICD-10-CM

## 2022-10-11 PROCEDURE — 36415 COLL VENOUS BLD VENIPUNCTURE: CPT

## 2022-10-11 PROCEDURE — 99205 OFFICE O/P NEW HI 60 MIN: CPT

## 2022-10-11 NOTE — ADDENDUM
[FreeTextEntry1] : .\par .\par Case discussed in clinical supervision with Dr. Lorena Quintana, PhD [[ X]] individual [[ ]] group (Check one)\par ASSESSMENT METHOD (Check all that apply): \par [[ X]] Case presentation \par [[ ]] Review of Record/Data \par [[ ]] Direct Observation \par [[ ]] Audio Recording \par [[ ]] Video Recording \par FOCUS OF SUPERVISION (Check all that apply): \par [[ ]] Diagnosis & Assessment \par [[X ]] Intervention \par [[ ]] Professional Conduct \par [[ ]] Culture and Diversity \par [[ ]] Scholarly Inquiry   \par [[ ]] Consultation \par [[ ]] Supervision \par [[ ]] Administration/Documentation\par \par

## 2022-10-11 NOTE — PLAN
[de-identified] : Ms. Mckeon arrived to session on time.  Ms. Rodriguez discussed recent prenatal follow ups with her doctor and planning/advising specific treatment/care during pregnancy.  Ms. Rodriguez and writer also discussed anxiety symptoms as well as coping strategies/tools to support navigating and managing anxiety. Pt ended session in good behavioral and emotional control. [FreeTextEntry1] : Ms. Rodriguez will continue with weekly therapy. IAP to be reviewed 1/2023.

## 2022-10-12 LAB
25(OH)D3 SERPL-MCNC: 37.8 NG/ML
ALBUMIN SERPL ELPH-MCNC: 4.1 G/DL
ALP BLD-CCNC: 93 U/L
ALT SERPL-CCNC: 16 U/L
ANION GAP SERPL CALC-SCNC: 15 MMOL/L
AST SERPL-CCNC: 21 U/L
BASOPHILS # BLD AUTO: 0.04 K/UL
BASOPHILS NFR BLD AUTO: 0.4 %
BILIRUB SERPL-MCNC: 0.3 MG/DL
BUN SERPL-MCNC: 6 MG/DL
CALCIUM SERPL-MCNC: 9.3 MG/DL
CHLORIDE SERPL-SCNC: 103 MMOL/L
CO2 SERPL-SCNC: 19 MMOL/L
CREAT SERPL-MCNC: 0.49 MG/DL
EGFR: 128 ML/MIN/1.73M2
EOSINOPHIL # BLD AUTO: 0.11 K/UL
EOSINOPHIL NFR BLD AUTO: 1.2 %
ESTIMATED AVERAGE GLUCOSE: 108 MG/DL
FERRITIN SERPL-MCNC: 20 NG/ML
FOLATE SERPL-MCNC: >20 NG/ML
GLUCOSE SERPL-MCNC: 94 MG/DL
HBA1C MFR BLD HPLC: 5.4 %
HCT VFR BLD CALC: 38 %
HGB BLD-MCNC: 12.3 G/DL
IMM GRANULOCYTES NFR BLD AUTO: 1.4 %
LYMPHOCYTES # BLD AUTO: 1.76 K/UL
LYMPHOCYTES NFR BLD AUTO: 18.4 %
MAN DIFF?: NORMAL
MCHC RBC-ENTMCNC: 29.9 PG
MCHC RBC-ENTMCNC: 32.4 GM/DL
MCV RBC AUTO: 92.2 FL
MONOCYTES # BLD AUTO: 0.49 K/UL
MONOCYTES NFR BLD AUTO: 5.1 %
NEUTROPHILS # BLD AUTO: 7.02 K/UL
NEUTROPHILS NFR BLD AUTO: 73.5 %
PLATELET # BLD AUTO: 356 K/UL
POTASSIUM SERPL-SCNC: 4.2 MMOL/L
PROT SERPL-MCNC: 7.2 G/DL
RBC # BLD: 4.12 M/UL
RBC # FLD: 14.1 %
SODIUM SERPL-SCNC: 138 MMOL/L
TSH SERPL-ACNC: 1.44 UIU/ML
VIT B12 SERPL-MCNC: 240 PG/ML
WBC # FLD AUTO: 9.55 K/UL

## 2022-10-12 RX ORDER — METFORMIN HYDROCHLORIDE 500 MG/1
500 TABLET, COATED ORAL TWICE DAILY
Qty: 60 | Refills: 5 | Status: ACTIVE | COMMUNITY
Start: 2022-10-12 | End: 1900-01-01

## 2022-10-12 RX ORDER — METFORMIN HYDROCHLORIDE 500 MG/1
500 TABLET, FILM COATED, EXTENDED RELEASE ORAL TWICE DAILY
Qty: 60 | Refills: 5 | Status: DISCONTINUED | COMMUNITY
Start: 2022-10-11 | End: 2022-10-12

## 2022-10-13 ENCOUNTER — APPOINTMENT (OUTPATIENT)
Dept: PSYCHIATRY | Facility: CLINIC | Age: 32
End: 2022-10-13

## 2022-10-13 PROCEDURE — 99214 OFFICE O/P EST MOD 30 MIN: CPT | Mod: 95
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
## 2022-10-13 NOTE — HISTORY OF PRESENT ILLNESS
[FreeTextEntry1] : AT TIME OF INTAKE:\par Patient is a 32 year old, Cisgender, Heterosexual, Central African-American, Female born in [facility].C. Pt grew up living with her mother and step-father primarily following the separation of her mother and father at 3 years old. Pt presents to treatment with sxs of heightened anxiety, ruminations, catastrophizing, frequent crying spells, and restlessness (despite long periods of sleeping). Pt is currently 5 months pregnant and reports that anxiety sxs have become exacerbated by the pregnancy. Pt reports a history of chronic anxiety, ADHD, and episodes of depression throughout her life. Pt was in therapy "on and off" since she was 5. Pt was in therapy from [age] years old (taking zoloft and then paxil). Went to therapy in middle school [age] years old. Went to therapy from [age] years old (took Saphris, Wellbutrin, Klonopin, Xanax, Vivance, Adderall, Propanalol), After college did not take medications. Has not been in therapy again until the beginning of her pregnancy (currently taking zoloft). No hx of psychological testing. 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 According to pt report, she does not endorse any aftab or manic symptoms in her history. Towards the end of pt's high school years pt endorses suicidality preparatory behaviors and self-injurious actions (cutting). Pt does not currently endorse suicidal ideation/plan/mean/intent. Pt reports infrequent alcohol use prior to pregnancy (1-2 a year) but reports tobacco use from college until age 28, smoking a pack of cigarettes a day. Pt does not currently endorse tobacco use. Pt has no hx of hospitalizations. Pt is currently being prescribed 50mg of zoloft.\par Pt reports that the pregnancy has been difficult due to severe morning sickness, she reports being unable to work currently due to the nausea she is experiencing.\par FH: Patient's reports that her father has substance abuse issues with opioids. Pt reports that her two cousins have depression and anxiety. Pt reports no other psychiatric hx in her family.\par \par \par The appointment was provided by a clinician located remotely in [place] via: Zoom\par [X[ ]] Telemedicine using real time 2-way video technology \par [[ ]] Telephone. \par The patient was located in their private home or place of work in [place].\par \par Patient/parent/guardian stated name and date of birth to confirm identity.  Patient informed of the limits of HIPAA compliance and privacy inherent in the use of technology.  Verbal consent for remote appointment was obtained.\par \par PSYCHOSIS: N/A\par [[ ]] Paranoid ideation\par [[ ]] Ideas of reference\par [[ ]] Delusions of grandeur\par [[ ]] Thought broadcasting\par \par DISSOCIATIVE EXPERIENCES: N/A\par [[ ]] Derealization\par [[ ]] Depersonalization\par \par NEGATIVE SYMPTOMS: \par [[X ]] Apathy\par [[ ]] Anhedonia\par [[ ]] Lack of social interest\par [[X ]] Loss of motivation\par \par \par How many times have you been pregnant?  \par [ ]first pregnancy\par How many children do you have? (how many living children?)\par [ ] 0\par Does anyone in your family have a history of anxiety or depression during or after pregnancy?  \par [ ] No hx\par FOR CURRENTLY/RECENTLY PREGNANT WOMEN (WITH A BABY):\par Was this a planned pregnancy, unplanned, or something else?\par [ ] Planned pregnancy\par What changes have been made to the usual routine as a result of pregnancy (e.g. dietary changes, changes in activities or hobbies, changes to work)?\par [ ] Pt is on short term disability, is unable to work due to morning sickness. Less time for hobbies, activities. More time sleeping (10-14 hrs)\par What are/were the most pleasant aspects of pregnancy?\par [ ] The idea of 'growing a child', having a child afterwards. Buying things for the baby.\par What are/were the most unpleasant aspects of pregnancy?\par [ ] Morning sickness, the increased anxiety with the increased responsibility.\par What are the plans for feeding baby?\par [ ] Plans on breast feeding and using formula if she is unable to breastfeed.\par What are the plans for paid employment/work outside the home?\par [ ]  Pt is undecided, she will be taking a maternity leave but is considering being a stay at home.  \par Will any extended family be involved in childcare?\par [ ] Pt's in-laws will help to babysit. \par  [FreeTextEntry2] : PAST OUTPATIENT TREATMENT (PSYCHOTHERAPY, PSYCHOPHARMACOLOGY, PSYCHOLOGICAL TESTING):\par [ ]Pt was in therapy "on and off" since she was 5. Pt was in therapy from [age] years old (taking zoloft and then paxil). Went to therapy in middle school [age] years old. Went to therapy from [age] years old (took Saphris, Wellbutrin, Klonopin, Xanax, Vivance, Adderall, Propanalol), After college did not take medications. Has not been in therapy again until the beginning of her pregnancy (currently taking zoloft). No hx of psychological testing.\par PAST PSYCHIATRIC MEDICATIONS (NAMES, DOSES, DATES TAKEN, EFFECTIVENESS):\par [ ]  Does not remember doses of previous medications; currently taking 50 mg of Zoloft.\par PAST HOSPITALIZATIONS (DATES, REASON FOR ADMISSION, LENGTH OF STAY, TREATMENT, LAST PSYCHIATRIC TREATMENT):\par  [ ] No\par DATE OF LAST PHYSICAL EXAMINATION:\par [ ]No last physical exam, does not have a PCP\par .\par .\par BH MEDICATION SIDE EFFECTS: no side effects with current zoloft. Experienced weight gain with wellbutrin.\par  [No] : no [Yes > 3 months ago] : yes, > 3 months ago [Perceived burden on family or others] : perceived burden on family or others [History of abuse/trauma] : history of abuse/trauma [Other___] : [unfilled] [Responsibility to family or others] : responsibility to family or others [Identifies reasons for living] : identifies reasons for living [Future oriented] : future oriented [Supportive social network or family] : supportive social network or family [Positive therapeutic relationships] : positive therapeutic relationships [TextBox_52] : Pt was in high school and was engaging in cutting behaviors. Pt felt she did not have control of her life at the time. Preparatory behaviors included writing a letter and getting pills ready (end of high school, beginning of college). [Yes] : yes [None Known] : none known [Residential stability] : residential stability [Relationship stability] : relationship stability [Engagement in treatment] : engagement in treatment [Insight into violence risk and need for management/ treatment] : insight into violence risk and need for management/ treatment

## 2022-10-13 NOTE — RISK ASSESSMENT
[No, patient denies ideation or behavior] : No, patient denies ideation or behavior [FreeTextEntry9] : Low acute risk of harm to self/others at this time.\par \par  Low acute risk of self harm, despite anxiety.  Protected by responsibilities to family, support network, in therapy, reasons for living, future-oriented ,motivated for treatment .  Denies si/hi/ah/vh, future oriented, euthumic affect.

## 2022-10-13 NOTE — REASON FOR VISIT
[FreeTextEntry1] : medication management [Home] : at home, [unfilled] , at the time of the visit. [Other Location: e.g. Home (Enter Location, City,State)___] : at [unfilled] [Other:____] : [unfilled] [Patient] : the patient

## 2022-10-13 NOTE — DISCUSSION/SUMMARY
[FreeTextEntry1] : 32 year old cis-gendered, heterosexual Latvian American female, lives with her  in [place], works as  at [employer], currently on leave 2’ being 23 wks pregnant, due 2023, PPH of depression, anxiety, ADHD, h/o cutting in high school, in therapy since age 5 on and off, numerous medication trials, specifically Zoloft and paxil for anxiety, restarted on Zoloft 50mg at beginning of pregnancy for anxiety, denies substance abuse, h/o smoking, \par Referred by OBGYN, presents for assessment and treatment of increased anxiety during pregnancy.\par Pt found out she was pregnant in 2022.  This is her first pregnancy, no children, no FH of  depression or anxiety, planned pregnancy, on ST disability secondary to morning sickness, happy to be growing a child. Plans to breastfeed.  Unsure if she will cont to work after pregnancy.  In-laws will help with childcare. Last gyn exam was 2 wks.  LMP 3/30, FMP age 14.  \par She describes feelings of apathy, amotivation, catastrophizing, frequent crying spells, excessive worries, ruminations, restlessness, and guilt about fears of negatively impacting her pregnancy.  She states anxiety comes daily but is not constant.  No panic attacks, last PA was 4 years ago.  Denies SI/HI/AH/VH.  Denies h/o manic episodes, denies hypersexuality, increased spending, decreased need for sleep, impulsive behaviors.  Presents with euthymic mood.   \par PPH – SIB (cutting) in high school, has written suicide letters and had pills ready when in high school.  Since then no attempts, preparatory acts or SIB.  Aggressive behaviors in middle school, states didn’t know how to show affection or love at that time.  No aggressive behaviors since then.  Therapy since age 5, from [age] longest, on Zoloft, then Paxil for anxiety.  From [age], -, numerous medication trials including Sapphris, Wellbutrin (weight gain), Klonopin, Xanax, Vyvanse, Adderall (weight loss), propranolol, Paxil, Zoloft.  Has been treated for anxiety, depression, ADHD ( based off of restlessness, low concentration, energy, and trouble sleeping) and bipolar dx (based off “slut phase at age 20 - One prior provider suggested she may have bipolar disorder in the past.  Patient denies s/s of prior manic episodes and states she doesn’t think she has it.) No meds since age 25.  Restarted therapy at beginning of pregnancy.  Denies past psychiatric hospitalizations.  \par SUBS – alcohol once a year, none during pregnancy, h/o smoking age [age], pack a day, not smoking now.\par MEDS- Zoloft 50mg daily since 2022 during pregnancy, prescribed by OBGYN.  No side effects.  \par ALL- soy\par FH – coiusin with anxiety/depression, no FH of suicide, biological father abused opiates.\par SH- lives with , college – BS in [employer] science, identifies as Mu-ism  American, raised in [place], no developmental delays, public school, As & Bs, no learning disabilities noted, parents  when she was 3years old, moved around a lot with various family members, stepfather involved in life, close to him, limited interactions with her biological father, childhood neglect when with her father, mother was emotionally critical, close to her maternal extended family, close to cousin.   is most supportive relationship she’s ever had.  Losses – parental divorce, loss of childhood friend at age 9, loss of grandparent at age 19, grandmother last year from dementia. 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 Protected by responsibilities to family, support network, in therapy, reasons for living, future-oriented ,motivated for treatment .  Denies si/hi/ah/vh, future oriented, euthumic affect.\par DDX – anxiety, , r/o bipolar d/o\par PLAN – wants to transition care to [facility] treatment team\par Patient gives verbal consent to speak with patient’s . Nam Rodriguez [phone],  less than a year, known him for 7.5 years.\par Anxiety is much more manageable.  \par pt admitted to clinic - \par CLINIC COURSE:  [facility].  She began treatment with therapist.  She has been taking sertraline 50mg qhs with good effect since Aug 2022 and would like to continue it going forward.  RBA of medications discussed with patient. Describes anxiety as improving with brief, infrequent episodes of anxiety.  Denies SI/HI/AH/VH. No evidence of aftab or psychosis.  \par \par PLAN:\par - Supportive therapy provided\par - continue individual PMAD therapy weekly\par - Continue Zoloft 50mg daily\par - Pt aware of emergency resources as needed

## 2022-10-13 NOTE — PHYSICAL EXAM
[None] : none [Cooperative] : cooperative [Euthymic] : euthymic [Full] : full [Clear] : clear [Linear/Goal Directed] : linear/goal directed [Average] : average [WNL] : within normal limits

## 2022-10-13 NOTE — PAST MEDICAL HISTORY
[FreeTextEntry1] : \par NAME OF GP/PCP:  [ ] No PCP\par DATE OF LAST GYN EXAM:  [ ] two weeks prior to current visit\par FIRST MENSTRUAL PERIOD: [ ] 14 years old\par LAST MENSTRUAL PERIOD:  [ ] March 30th 2022\par

## 2022-10-13 NOTE — FAMILY HISTORY
[FreeTextEntry1] : PSYCHIATRIC ILLNESS:\par [ ] Cousins with anxiety/depression\par SUICIDE:\par [ ] No family history\par SUBSTANCE ABUSE:\par  [ ]Biological father abused opioids.

## 2022-10-17 ENCOUNTER — APPOINTMENT (OUTPATIENT)
Dept: MATERNAL FETAL MEDICINE | Facility: CLINIC | Age: 32
End: 2022-10-17

## 2022-10-17 ENCOUNTER — ASOB RESULT (OUTPATIENT)
Age: 32
End: 2022-10-17

## 2022-10-17 ENCOUNTER — APPOINTMENT (OUTPATIENT)
Dept: ANTEPARTUM | Facility: CLINIC | Age: 32
End: 2022-10-17

## 2022-10-17 PROCEDURE — 76819 FETAL BIOPHYS PROFIL W/O NST: CPT | Mod: 59

## 2022-10-17 PROCEDURE — 76816 OB US FOLLOW-UP PER FETUS: CPT

## 2022-10-17 NOTE — HISTORY OF PRESENT ILLNESS
[FreeTextEntry1] : Maternal-Fetal Medicine consultation \par Prenatal care: Dr. Clark\par \par Chief compliant: Nausea and possible GDM\par \par LILLIAN MORALES is a 32 year  at 27w6d (HAMZAH 23) that presents for a MFM consultation. She completed the one hour GCT which was abnormal, 149. However, due to persistent n/v she was unable to complete the 3 hour gTT and has been performing fingerstick monitoring.\par \par Fastings: 100 to 110s\par Postprandial: Vast majority < 140\par \par Medical history notable for generalized anxiety, followed at [facility]. Surgical history is otherwise unremarkable (wisdom teeth removal). Gynecologic history is unremarkable.\par \par Current medications include Zoloft 50 mg daily, metoclopramide 10mg TID, prenatal vitamins, stool softner, ASA 81 mg, B6/unisom, prilosec. \par Allergies: NKDA\par \par She is not working currently (previously  now out on disability). She lives with her , Vincent. Prior tobacco use, quit ~3 years ago. She denies alcohol or drug use in this pregnancy.  She has never received blood products but is willing to receive them if needed. \par \par Family history is notable for colon ca in her father (estranged, history not well known). Mother may have preDM and pre-glaucoma. No siblings. She denies a family history of birth defects, mental retardation, developmental delay or genetic disorders. Denies family history of obstetric complications such as preeclampsia, stillbirth or  delivery.\par \par Height: 5'7" Prepregnancy weight: ~221# (current weight, 221#)

## 2022-10-17 NOTE — DISCUSSION/SUMMARY
[FreeTextEntry1] : 1. GDM\par Maintaining good glycemic control is the key intervention for reducing the frequency and/or severity of complications related to gestational diabetes mellitus (GDM). We reviewed the risks associated with GDM including macrosomia/LGA infant, preeclampsia, polyhydramnios, stillbirth and  morbidities (such as hypoglycemia, hyperbilirubinemia, electrolyte abnormalities, polycythemia, respiratory distress and cardiomyopathy). Risks associated with GDM beyond the pregnancy and  period were also briefly discussed including development of obesity, impaired glucose tolerance, or metabolic syndrome. Fortunately, with good glucose control in pregnancy all these risks can be dramatically reduced.\par \par GDM is also a strong marker for maternal development of type 2 diabetes. She was counseled that lifestyle interventions, which she is already instituting, such as dietary changes and exercise can reduce her lifetime risk of DM. Exercise in pregnancy was encouraged, and follow-up with the nutritionist as needed.\par \par Based on the glucose log, medical therapy was recommended. Risks, benefits and alternative treatments were reviewed, we reviewed that insulin is generally considered the treatment of choice. However, given isolated and persistent mild fasting elevations and issues with PO intake due to n/v favor starting with metformin . She agrees with the plan.\par \par 2. Obesity\par Management and risks reviewed. Though Angelika has had minimal weight gain in the pregnancy, there is data to suggest this may optimize pregnancy outcomes given the prepregnancy weight. Continued fetal growth surveillance suggested.\par \par 3. Nausea and vomiting in pregnancy\par Reviewed treatment strategies which appear optimized. Management reviewed.\par \par 4. GEORGE with SSRI use in pregnancy\par Risks and management reviewed. Sx appear well controlled with SSRI, continue current treatment including behavioral health f/u.\par \par Recommendations:\par 1. Start metformin 500 mg daily. Side effect reviewed\par 2. Follow-up in ultrasound unit next week\par 3. Check blood work

## 2022-10-18 ENCOUNTER — APPOINTMENT (OUTPATIENT)
Dept: PSYCHIATRY | Facility: CLINIC | Age: 32
End: 2022-10-18

## 2022-10-19 ENCOUNTER — NON-APPOINTMENT (OUTPATIENT)
Age: 32
End: 2022-10-19

## 2022-10-19 ENCOUNTER — OUTPATIENT (OUTPATIENT)
Dept: OUTPATIENT SERVICES | Facility: HOSPITAL | Age: 32
LOS: 1 days | End: 2022-10-19
Payer: COMMERCIAL

## 2022-10-19 DIAGNOSIS — O26.899 OTHER SPECIFIED PREGNANCY RELATED CONDITIONS, UNSPECIFIED TRIMESTER: ICD-10-CM

## 2022-10-19 DIAGNOSIS — Z3A.00 WEEKS OF GESTATION OF PREGNANCY NOT SPECIFIED: ICD-10-CM

## 2022-10-19 LAB — GLUCOSE BLDC GLUCOMTR-MCNC: 114 MG/DL — HIGH (ref 70–99)

## 2022-10-19 PROCEDURE — 99214 OFFICE O/P EST MOD 30 MIN: CPT

## 2022-10-19 PROCEDURE — 82962 GLUCOSE BLOOD TEST: CPT

## 2022-10-20 NOTE — ED ADULT NURSE NOTE - PRIMARY CARE PROVIDER
Kristie Perrin  Phone: [phone]   Fax: [phone]          Physical Therapy Treatment Note/ Progress Report:     Date:  10/20/2022    Patient Name:  Pedro Oh    :  1985  MRN: [id number]  [id number]  URL: [email or web address]  [id number])   [] EVAL - MOD (76127)  [] EVAL - HIGH (26133)  [] RE-EVAL (40760)  [x] FT(54133) x 1    [] Ionto  [x] NMR (75154) x1 [] Vaso  [x] Manual (82902) x 1      [] Ultrasound  [] TA x   1     [] Mech Traction (98832)  [] Aquatic Therapy x     [] ES (un) (81544):   [] Home Management Training x  [] ES(attended) (14475)   [] Dry Needling 1-2 muscles (74678):  [] Dry Needling 3+ muscles (799303  [] pmd

## 2022-10-20 NOTE — REASON FOR VISIT
[Patient] : Patient [Telehealth (audio & video) - Individual/Group] : This visit was provided via telehealth using real-time 2-way audio visual technology. [Other Location: e.g. Home (Enter Location, City,State)___] : The provider was located at [unfilled]. [Home] : The patient, [unfilled], was located at home, [unfilled], at the time of the visit. [Verbal consent obtained from patient/other participant(s)] : Verbal consent for telehealth/telephonic services obtained from patient/other participant(s) [FreeTextEntry1] : Pt arrived for weekly psychotherapy appointment

## 2022-10-20 NOTE — PLAN
[Integrative Therapy] : Integrative Therapy  [Psychodynamic Therapy] : Psychodynamic Therapy  [Recommended Frequency of Visits: ____] : Recommended frequency of visits: [unfilled] [Return in ____ week(s)] : Return in [unfilled] week(s) [FreeTextEntry2] : Goal A. Reduce symptoms of anxiety during pregnancy\par Objective A. Pt will utilize coping skills and strategies to more effectively manage anxiety. [de-identified] : Ms. Mckeon arrived to session on time.  Ms. Rodriguez reported good mood. Writer started the process of a clinical semistructured interview to better assess and understand Ms. Rodriguez's current relationships as well as early childhood relationships with significant others, including caregivers. Pt ended session in good behavioral and emotional control. [FreeTextEntry1] : Ms. Rodriguez will continue with weekly therapy. IAP to be reviewed 1/2023.

## 2022-10-24 ENCOUNTER — APPOINTMENT (OUTPATIENT)
Dept: OBGYN | Facility: CLINIC | Age: 32
End: 2022-10-24

## 2022-10-24 VITALS
OXYGEN SATURATION: 95 % | DIASTOLIC BLOOD PRESSURE: 75 MMHG | SYSTOLIC BLOOD PRESSURE: 105 MMHG | WEIGHT: 221.5 LBS | BODY MASS INDEX: 34.69 KG/M2

## 2022-10-24 PROCEDURE — 0502F SUBSEQUENT PRENATAL CARE: CPT

## 2022-10-25 ENCOUNTER — APPOINTMENT (OUTPATIENT)
Dept: PSYCHIATRY | Facility: CLINIC | Age: 32
End: 2022-10-25

## 2022-11-01 ENCOUNTER — APPOINTMENT (OUTPATIENT)
Dept: PSYCHIATRY | Facility: CLINIC | Age: 32
End: 2022-11-01

## 2022-11-01 PROCEDURE — 99214 OFFICE O/P EST MOD 30 MIN: CPT | Mod: 95

## 2022-11-02 ENCOUNTER — APPOINTMENT (OUTPATIENT)
Dept: ANTEPARTUM | Facility: CLINIC | Age: 32
End: 2022-11-02

## 2022-11-02 ENCOUNTER — ASOB RESULT (OUTPATIENT)
Age: 32
End: 2022-11-02

## 2022-11-02 PROCEDURE — 76819 FETAL BIOPHYS PROFIL W/O NST: CPT

## 2022-11-02 NOTE — REASON FOR VISIT
[Home] : at home, [unfilled] , at the time of the visit. [Other Location: e.g. Home (Enter Location, City,State)___] : at [unfilled] [Other:____] : [unfilled] [Patient] : the patient [FreeTextEntry1] : medication management
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
## 2022-11-02 NOTE — HISTORY OF PRESENT ILLNESS
[No] : no [Yes > 3 months ago] : yes, > 3 months ago [Perceived burden on family or others] : perceived burden on family or others [History of abuse/trauma] : history of abuse/trauma [Other___] : [unfilled] [Responsibility to family or others] : responsibility to family or others [Identifies reasons for living] : identifies reasons for living [Future oriented] : future oriented [Supportive social network or family] : supportive social network or family [Positive therapeutic relationships] : positive therapeutic relationships [Yes] : yes [None Known] : none known [Residential stability] : residential stability [Relationship stability] : relationship stability [Engagement in treatment] : engagement in treatment [Insight into violence risk and need for management/ treatment] : insight into violence risk and need for management/ treatment [FreeTextEntry1] : AT TIME OF INTAKE:\par Patient is a 32 year old, Cisgender, Heterosexual, [place]er-American, Female born in [facility]. Pt grew up living with her mother and step-father primarily following the separation of her mother and father at 3 years old. Pt presents to treatment with sxs of heightened anxiety, ruminations, catastrophizing, frequent crying spells, and restlessness (despite long periods of sleeping). Pt is currently 5 months pregnant and reports that anxiety sxs have become exacerbated by the pregnancy. Pt reports a history of chronic anxiety, ADHD, and episodes of depression throughout her life. Pt was in therapy "on and off" since she was 5. Pt was in therapy from [age] years old (taking zoloft and then paxil). Went to therapy in middle school [age] years old. Went to therapy from [age] years old (took Saphris, Wellbutrin, Klonopin, Xanax, Vivance, Adderall, Propanalol), After college did not take medications. Has not been in therapy again until the beginning of her pregnancy (currently taking zoloft). No hx of psychological testing. 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 According to pt report, she does not endorse any aftab or manic symptoms in her history. Towards the end of pt's high school years pt endorses suicidality preparatory behaviors and self-injurious actions (cutting). Pt does not currently endorse suicidal ideation/plan/mean/intent. Pt reports infrequent alcohol use prior to pregnancy (1-2 a year) but reports tobacco use from college until age 28, smoking a pack of cigarettes a day. Pt does not currently endorse tobacco use. Pt has no hx of hospitalizations. Pt is currently being prescribed 50mg of zoloft.\par Pt reports that the pregnancy has been difficult due to severe morning sickness, she reports being unable to work currently due to the nausea she is experiencing.\par FH: Patient's reports that her father has substance abuse issues with opioids. Pt reports that her two cousins have depression and anxiety. Pt reports no other psychiatric hx in her family.\par \par \par PSYCHOSIS: N/A\par [[ ]] Paranoid ideation\par [[ ]] Ideas of reference\par [[ ]] Delusions of grandeur\par [[ ]] Thought broadcasting\par \par DISSOCIATIVE EXPERIENCES: N/A\par [[ ]] Derealization\par [[ ]] Depersonalization\par \par NEGATIVE SYMPTOMS: \par [[X ]] Apathy\par [[ ]] Anhedonia\par [[ ]] Lack of social interest\par [[X ]] Loss of motivation\par \par \par How many times have you been pregnant?  \par [ ]first pregnancy\par How many children do you have? (how many living children?)\par [ ] 0\par Does anyone in your family have a history of anxiety or depression during or after pregnancy?  \par [ ] No hx\par FOR CURRENTLY/RECENTLY PREGNANT WOMEN (WITH A BABY):\par Was this a planned pregnancy, unplanned, or something else?\par [ ] Planned pregnancy\par What changes have been made to the usual routine as a result of pregnancy (e.g. dietary changes, changes in activities or hobbies, changes to work)?\par [ ] Pt is on short term disability, is unable to work due to morning sickness. Less time for hobbies, activities. More time sleeping (10-14 hrs)\par What are/were the most pleasant aspects of pregnancy?\par [ ] The idea of 'growing a child', having a child afterwards. Buying things for the baby.\par What are/were the most unpleasant aspects of pregnancy?\par [ ] Morning sickness, the increased anxiety with the increased responsibility.\par What are the plans for feeding baby?\par [ ] Plans on breast feeding and using formula if she is unable to breastfeed.\par What are the plans for paid employment/work outside the home?\par [ ]  Pt is undecided, she will be taking a maternity leave but is considering being a stay at home.  \par Will any extended family be involved in childcare?\par [ ] Pt's in-laws will help to babysit. \par  [FreeTextEntry2] : PAST OUTPATIENT TREATMENT (PSYCHOTHERAPY, PSYCHOPHARMACOLOGY, PSYCHOLOGICAL TESTING):\par [ ]Pt was in therapy "on and off" since she was 5. Pt was in therapy from [age] years old (taking zoloft and then paxil). Went to therapy in middle school [age] years old. Went to therapy from [age] years old (took Saphris, Wellbutrin, Klonopin, Xanax, Vivance, Adderall, Propanalol), After college did not take medications. Has not been in therapy again until the beginning of her pregnancy (currently taking zoloft). No hx of psychological testing.\par PAST PSYCHIATRIC MEDICATIONS (NAMES, DOSES, DATES TAKEN, EFFECTIVENESS):\par [ ]  Does not remember doses of previous medications; currently taking 50 mg of Zoloft.\par PAST HOSPITALIZATIONS (DATES, REASON FOR ADMISSION, LENGTH OF STAY, TREATMENT, LAST PSYCHIATRIC TREATMENT):\par  [ ] No\par DATE OF LAST PHYSICAL EXAMINATION:\par [ ]No last physical exam, does not have a PCP\par .\par .\par BH MEDICATION SIDE EFFECTS: no side effects with current zoloft. Experienced weight gain with wellbutrin.\par  [TextBox_52] : Pt was in high school and was engaging in cutting behaviors. Pt felt she did not have control of her life at the time. Preparatory behaviors included writing a letter and getting pills ready (end of high school, beginning of college).

## 2022-11-02 NOTE — DISCUSSION/SUMMARY
[FreeTextEntry1] : 32 year old cis-gendered, heterosexual Arabic American female, lives with her  in [place], works as  at [employer], currently on leave 2’ being 23 wks pregnant, due 2023, PPH of depression, anxiety, ADHD, h/o cutting in high school, in therapy since age 5 on and off, numerous medication trials, specifically Zoloft and paxil for anxiety, restarted on Zoloft 50mg at beginning of pregnancy for anxiety, denies substance abuse, h/o smoking, \par Referred by OBGYN, presents for assessment and treatment of increased anxiety during pregnancy.\par Pt found out she was pregnant in 2022.  This is her first pregnancy, no children, no FH of  depression or anxiety, planned pregnancy, on ST disability secondary to morning sickness, happy to be growing a child. Plans to breastfeed.  Unsure if she will cont to work after pregnancy.  In-laws will help with childcare. Last gyn exam was 2 wks.  LMP 3/30, FMP age 14.  \par She describes feelings of apathy, amotivation, catastrophizing, frequent crying spells, excessive worries, ruminations, restlessness, and guilt about fears of negatively impacting her pregnancy.  She states anxiety comes daily but is not constant.  No panic attacks, last PA was 4 years ago.  Denies SI/HI/AH/VH.  Denies h/o manic episodes, denies hypersexuality, increased spending, decreased need for sleep, impulsive behaviors.  Presents with euthymic mood.   \par PPH – SIB (cutting) in high school, has written suicide letters and had pills ready when in high school.  Since then no attempts, preparatory acts or SIB.  Aggressive behaviors in middle school, states didn’t know how to show affection or love at that time.  No aggressive behaviors since then.  Therapy since age 5, from [age] longest, on Zoloft, then Paxil for anxiety.  From [age], -, numerous medication trials including Sapphris, Wellbutrin (weight gain), Klonopin, Xanax, Vyvanse, Adderall (weight loss), propranolol, Paxil, Zoloft.  Has been treated for anxiety, depression, ADHD ( based off of restlessness, low concentration, energy, and trouble sleeping) and bipolar dx (based off “slut phase at age 20 - One prior provider suggested she may have bipolar disorder in the past.  Patient denies s/s of prior manic episodes and states she doesn’t think she has it.) No meds since age 25.  Restarted therapy at beginning of pregnancy.  Denies past psychiatric hospitalizations.  \par SUBS – alcohol once a year, none during pregnancy, h/o smoking age [age], pack a day, not smoking now.\par MEDS- Zoloft 50mg daily since 2022 during pregnancy, prescribed by OBGYN.  No side effects.  \par ALL- soy\par FH – coiusin with anxiety/depression, no FH of suicide, biological father abused opiates.\par SH- lives with , college – BS in [employer] science, identifies as Sikh  American, raised in [place], no developmental delays, public school, As & Bs, no learning disabilities noted, parents  when she was 3years old, moved around a lot with various family members, stepfather involved in life, close to him, limited interactions with her biological father, childhood neglect when with her father, mother was emotionally critical, close to her maternal extended family, close to cousin.   is most supportive relationship she’s ever had.  Losses – parental divorce, loss of childhood friend at age 9, loss of grandparent at age 19, grandmother last year from dementia. 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 Protected by responsibilities to family, support network, in therapy, reasons for living, future-oriented ,motivated for treatment .  Denies si/hi/ah/vh, future oriented, euthumic affect.\par DDX – anxiety, , r/o bipolar d/o\par PLAN – wants to transition care to [facility] treatment team\par Patient gives verbal consent to speak with patient’s . Nam Rodriguez [phone],  less than a year, known him for 7.5 years.\par Anxiety is much more manageable.  \par pt admitted to clinic - \par CLINIC COURSE:  [facility]  She began treatment with therapist.  She has been taking sertraline 50mg qhs with good effect since Aug 2022 and would like to continue it going forward.  RBA of medications discussed with patient. Describes anxiety as improving with brief, infrequent episodes of anxiety.  Denies SI/HI/AH/VH. No evidence of aftab or psychosis.  \par \par PLAN:\par - Supportive therapy provided\par - continue individual PMAD therapy weekly\par - Continue Zoloft 50mg daily\par - Pt aware of emergency resources as needed

## 2022-11-02 NOTE — PLAN
[Integrative Therapy] : Integrative Therapy  [Psychodynamic Therapy] : Psychodynamic Therapy  [Recommended Frequency of Visits: ____] : Recommended frequency of visits: [unfilled] [Return in ____ week(s)] : Return in [unfilled] week(s) [FreeTextEntry2] : Goal A. Reduce symptoms of anxiety during pregnancy\par Objective A. Pt will utilize coping skills and strategies to more effectively manage anxiety. [de-identified] : Ms. Rodriguez arrived to session on time.  Ms. Rodriguez reported feeling more tired. Ms. Rodriguez discussed difficulty within the last week receiving a gestational diabetes diagnosis and feeling temporarily disconnected from her pregnancy. Writer worked with Ms. Castillo to understand the emotional experiences related to pregnancy while validating all emotions that may arise over the course of her pregnancy. Pt ended session in good behavioral and emotional control. [FreeTextEntry1] : Ms. Rodriguez will continue with weekly therapy. IAP to be reviewed 1/2023.

## 2022-11-08 ENCOUNTER — APPOINTMENT (OUTPATIENT)
Dept: PSYCHIATRY | Facility: CLINIC | Age: 32
End: 2022-11-08

## 2022-11-09 ENCOUNTER — OUTPATIENT (OUTPATIENT)
Dept: OUTPATIENT SERVICES | Facility: HOSPITAL | Age: 32
LOS: 1 days | End: 2022-11-09
Payer: COMMERCIAL

## 2022-11-09 ENCOUNTER — TRANSCRIPTION ENCOUNTER (OUTPATIENT)
Age: 32
End: 2022-11-09

## 2022-11-09 ENCOUNTER — APPOINTMENT (OUTPATIENT)
Dept: ANTEPARTUM | Facility: CLINIC | Age: 32
End: 2022-11-09

## 2022-11-09 ENCOUNTER — NON-APPOINTMENT (OUTPATIENT)
Age: 32
End: 2022-11-09

## 2022-11-09 DIAGNOSIS — O26.899 OTHER SPECIFIED PREGNANCY RELATED CONDITIONS, UNSPECIFIED TRIMESTER: ICD-10-CM

## 2022-11-09 DIAGNOSIS — Z3A.00 WEEKS OF GESTATION OF PREGNANCY NOT SPECIFIED: ICD-10-CM

## 2022-11-09 LAB
ALBUMIN SERPL ELPH-MCNC: 3.7 G/DL — SIGNIFICANT CHANGE UP (ref 3.3–5)
ALP SERPL-CCNC: 104 U/L — SIGNIFICANT CHANGE UP (ref 40–120)
ALT FLD-CCNC: 11 U/L — SIGNIFICANT CHANGE UP (ref 10–45)
ANION GAP SERPL CALC-SCNC: 12 MMOL/L — SIGNIFICANT CHANGE UP (ref 5–17)
APTT BLD: 27.3 SEC — LOW (ref 27.5–35.5)
AST SERPL-CCNC: 16 U/L — SIGNIFICANT CHANGE UP (ref 10–40)
BASOPHILS # BLD AUTO: 0.04 K/UL — SIGNIFICANT CHANGE UP (ref 0–0.2)
BASOPHILS NFR BLD AUTO: 0.3 % — SIGNIFICANT CHANGE UP (ref 0–2)
BILIRUB SERPL-MCNC: 0.3 MG/DL — SIGNIFICANT CHANGE UP (ref 0.2–1.2)
BUN SERPL-MCNC: 8 MG/DL — SIGNIFICANT CHANGE UP (ref 7–23)
CALCIUM SERPL-MCNC: 9.7 MG/DL — SIGNIFICANT CHANGE UP (ref 8.4–10.5)
CHLORIDE SERPL-SCNC: 101 MMOL/L — SIGNIFICANT CHANGE UP (ref 96–108)
CO2 SERPL-SCNC: 22 MMOL/L — SIGNIFICANT CHANGE UP (ref 22–31)
CREAT ?TM UR-MCNC: 390 MG/DL — SIGNIFICANT CHANGE UP
CREAT SERPL-MCNC: 0.59 MG/DL — SIGNIFICANT CHANGE UP (ref 0.5–1.3)
EGFR: 123 ML/MIN/1.73M2 — SIGNIFICANT CHANGE UP
EOSINOPHIL # BLD AUTO: 0.16 K/UL — SIGNIFICANT CHANGE UP (ref 0–0.5)
EOSINOPHIL NFR BLD AUTO: 1.4 % — SIGNIFICANT CHANGE UP (ref 0–6)
FIBRINOGEN PPP-MCNC: 784 MG/DL — HIGH (ref 258–438)
GLUCOSE BLDC GLUCOMTR-MCNC: 100 MG/DL — HIGH (ref 70–99)
GLUCOSE SERPL-MCNC: 137 MG/DL — HIGH (ref 70–99)
HCT VFR BLD CALC: 36.6 % — SIGNIFICANT CHANGE UP (ref 34.5–45)
HGB BLD-MCNC: 11.7 G/DL — SIGNIFICANT CHANGE UP (ref 11.5–15.5)
IMM GRANULOCYTES NFR BLD AUTO: 1.1 % — HIGH (ref 0–0.9)
INR BLD: 1.04 — SIGNIFICANT CHANGE UP (ref 0.88–1.16)
LDH SERPL L TO P-CCNC: 133 U/L — SIGNIFICANT CHANGE UP (ref 50–242)
LYMPHOCYTES # BLD AUTO: 18.6 % — SIGNIFICANT CHANGE UP (ref 13–44)
LYMPHOCYTES # BLD AUTO: 2.19 K/UL — SIGNIFICANT CHANGE UP (ref 1–3.3)
MAGNESIUM SERPL-MCNC: 1.8 MG/DL — SIGNIFICANT CHANGE UP (ref 1.6–2.6)
MCHC RBC-ENTMCNC: 28.7 PG — SIGNIFICANT CHANGE UP (ref 27–34)
MCHC RBC-ENTMCNC: 32 GM/DL — SIGNIFICANT CHANGE UP (ref 32–36)
MCV RBC AUTO: 89.7 FL — SIGNIFICANT CHANGE UP (ref 80–100)
MONOCYTES # BLD AUTO: 0.57 K/UL — SIGNIFICANT CHANGE UP (ref 0–0.9)
MONOCYTES NFR BLD AUTO: 4.8 % — SIGNIFICANT CHANGE UP (ref 2–14)
NEUTROPHILS # BLD AUTO: 8.71 K/UL — HIGH (ref 1.8–7.4)
NEUTROPHILS NFR BLD AUTO: 73.8 % — SIGNIFICANT CHANGE UP (ref 43–77)
NRBC # BLD: 0 /100 WBCS — SIGNIFICANT CHANGE UP (ref 0–0)
PHOSPHATE SERPL-MCNC: 4 MG/DL — SIGNIFICANT CHANGE UP (ref 2.5–4.5)
PLATELET # BLD AUTO: 360 K/UL — SIGNIFICANT CHANGE UP (ref 150–400)
POTASSIUM SERPL-MCNC: 4.7 MMOL/L — SIGNIFICANT CHANGE UP (ref 3.5–5.3)
POTASSIUM SERPL-SCNC: 4.7 MMOL/L — SIGNIFICANT CHANGE UP (ref 3.5–5.3)
PROT ?TM UR-MCNC: 80 MG/DL — HIGH (ref 0–12)
PROT SERPL-MCNC: 7.6 G/DL — SIGNIFICANT CHANGE UP (ref 6–8.3)
PROT/CREAT UR-RTO: 0.2 RATIO — SIGNIFICANT CHANGE UP (ref 0–0.2)
PROTHROM AB SERPL-ACNC: 12.4 SEC — SIGNIFICANT CHANGE UP (ref 10.5–13.4)
RBC # BLD: 4.08 M/UL — SIGNIFICANT CHANGE UP (ref 3.8–5.2)
RBC # FLD: 14 % — SIGNIFICANT CHANGE UP (ref 10.3–14.5)
SODIUM SERPL-SCNC: 135 MMOL/L — SIGNIFICANT CHANGE UP (ref 135–145)
URATE SERPL-MCNC: 3.7 MG/DL — SIGNIFICANT CHANGE UP (ref 2.5–7)
WBC # BLD: 11.8 K/UL — HIGH (ref 3.8–10.5)
WBC # FLD AUTO: 11.8 K/UL — HIGH (ref 3.8–10.5)

## 2022-11-09 PROCEDURE — 93005 ELECTROCARDIOGRAM TRACING: CPT

## 2022-11-09 PROCEDURE — 82962 GLUCOSE BLOOD TEST: CPT

## 2022-11-09 PROCEDURE — 84100 ASSAY OF PHOSPHORUS: CPT

## 2022-11-09 PROCEDURE — 85730 THROMBOPLASTIN TIME PARTIAL: CPT

## 2022-11-09 PROCEDURE — 99214 OFFICE O/P EST MOD 30 MIN: CPT

## 2022-11-09 PROCEDURE — 84550 ASSAY OF BLOOD/URIC ACID: CPT

## 2022-11-09 PROCEDURE — 93306 TTE W/DOPPLER COMPLETE: CPT

## 2022-11-09 PROCEDURE — 84156 ASSAY OF PROTEIN URINE: CPT

## 2022-11-09 PROCEDURE — 85610 PROTHROMBIN TIME: CPT

## 2022-11-09 PROCEDURE — 80053 COMPREHEN METABOLIC PANEL: CPT

## 2022-11-09 PROCEDURE — 82570 ASSAY OF URINE CREATININE: CPT

## 2022-11-09 PROCEDURE — 93010 ELECTROCARDIOGRAM REPORT: CPT

## 2022-11-09 PROCEDURE — 93306 TTE W/DOPPLER COMPLETE: CPT | Mod: 26

## 2022-11-09 PROCEDURE — 85025 COMPLETE CBC W/AUTO DIFF WBC: CPT

## 2022-11-09 PROCEDURE — 85384 FIBRINOGEN ACTIVITY: CPT

## 2022-11-09 PROCEDURE — 83735 ASSAY OF MAGNESIUM: CPT

## 2022-11-09 PROCEDURE — 36415 COLL VENOUS BLD VENIPUNCTURE: CPT

## 2022-11-09 PROCEDURE — 83615 LACTATE (LD) (LDH) ENZYME: CPT

## 2022-11-11 ENCOUNTER — NON-APPOINTMENT (OUTPATIENT)
Age: 32
End: 2022-11-11

## 2022-11-11 ENCOUNTER — APPOINTMENT (OUTPATIENT)
Dept: OBGYN | Facility: CLINIC | Age: 32
End: 2022-11-11

## 2022-11-11 VITALS
WEIGHT: 220.25 LBS | BODY MASS INDEX: 34.5 KG/M2 | DIASTOLIC BLOOD PRESSURE: 79 MMHG | OXYGEN SATURATION: 98 % | SYSTOLIC BLOOD PRESSURE: 111 MMHG

## 2022-11-11 DIAGNOSIS — R00.2 PALPITATIONS: ICD-10-CM

## 2022-11-11 DIAGNOSIS — R76.8 OTHER SPECIFIED ABNORMAL IMMUNOLOGICAL FINDINGS IN SERUM: ICD-10-CM

## 2022-11-11 PROCEDURE — 0502F SUBSEQUENT PRENATAL CARE: CPT

## 2022-11-11 RX ORDER — VALACYCLOVIR 500 MG/1
500 TABLET, FILM COATED ORAL
Qty: 120 | Refills: 5 | Status: ACTIVE | COMMUNITY
Start: 2022-11-11 | End: 1900-01-01

## 2022-11-15 ENCOUNTER — APPOINTMENT (OUTPATIENT)
Dept: PSYCHIATRY | Facility: CLINIC | Age: 32
End: 2022-11-15

## 2022-11-16 NOTE — PLAN
[Integrative Therapy] : Integrative Therapy  [Psychodynamic Therapy] : Psychodynamic Therapy  [Recommended Frequency of Visits: ____] : Recommended frequency of visits: [unfilled] [Return in ____ week(s)] : Return in [unfilled] week(s) [FreeTextEntry2] : Goal A. Reduce symptoms of anxiety during pregnancy\par Objective A. Pt will utilize coping skills and strategies to more effectively manage anxiety. [de-identified] : Ms. Rodriguez arrived to session on time.  Ms. Rodriguez reported feeling increased nausea and fatigue. Ms. Rodriguez discussed waiting for date for labor induction. Writer continued to work with Ms. Castillo to understand the emotional experiences related to pregnancy while validating all emotions that may arise over the course of her pregnancy. Pt ended session in good behavioral and emotional control. [FreeTextEntry1] : Ms. Rodriguez will continue with weekly therapy. IAP to be reviewed 2/2023.

## 2022-11-17 ENCOUNTER — NON-APPOINTMENT (OUTPATIENT)
Age: 32
End: 2022-11-17

## 2022-11-17 ENCOUNTER — ASOB RESULT (OUTPATIENT)
Age: 32
End: 2022-11-17

## 2022-11-17 ENCOUNTER — APPOINTMENT (OUTPATIENT)
Dept: ANTEPARTUM | Facility: CLINIC | Age: 32
End: 2022-11-17

## 2022-11-17 PROCEDURE — 76819 FETAL BIOPHYS PROFIL W/O NST: CPT

## 2022-11-17 PROCEDURE — 76816 OB US FOLLOW-UP PER FETUS: CPT | Mod: 59

## 2022-11-22 ENCOUNTER — APPOINTMENT (OUTPATIENT)
Dept: PSYCHIATRY | Facility: CLINIC | Age: 32
End: 2022-11-22

## 2022-11-23 NOTE — PLAN
[Integrative Therapy] : Integrative Therapy  [Psychodynamic Therapy] : Psychodynamic Therapy  [Recommended Frequency of Visits: ____] : Recommended frequency of visits: [unfilled] [Return in ____ week(s)] : Return in [unfilled] week(s) [FreeTextEntry2] : Goal A. Reduce symptoms of anxiety during pregnancy\par Objective A. Pt will utilize coping skills and strategies to more effectively manage anxiety. [de-identified] : Ms. Rodriguez arrived to session on time.  Ms. Rodriguez reported feeling increased nausea and fatigue. Ms. Rodriguez discussed receiving date for labor induction.  Ms. Rodriguez and writer began to discuss anticipation for postpartum experiencee, excitement for meeting her  child, as well as screening/psychoeducation for postpartum depression/anxiety/psychosis. Pt ended session in good behavioral and emotional control. [FreeTextEntry1] : Ms. Rodriguez will continue with weekly therapy. IAP to be reviewed 2/2023.

## 2022-11-28 DIAGNOSIS — Z01.818 ENCOUNTER FOR OTHER PREPROCEDURAL EXAMINATION: ICD-10-CM

## 2022-11-29 ENCOUNTER — APPOINTMENT (OUTPATIENT)
Dept: PSYCHIATRY | Facility: CLINIC | Age: 32
End: 2022-11-29

## 2022-12-02 ENCOUNTER — APPOINTMENT (OUTPATIENT)
Dept: OBGYN | Facility: CLINIC | Age: 32
End: 2022-12-02

## 2022-12-02 VITALS
BODY MASS INDEX: 34.14 KG/M2 | WEIGHT: 218 LBS | HEART RATE: 114 BPM | DIASTOLIC BLOOD PRESSURE: 83 MMHG | SYSTOLIC BLOOD PRESSURE: 127 MMHG | OXYGEN SATURATION: 98 %

## 2022-12-02 PROCEDURE — 0502F SUBSEQUENT PRENATAL CARE: CPT

## 2022-12-06 ENCOUNTER — APPOINTMENT (OUTPATIENT)
Dept: PSYCHIATRY | Facility: CLINIC | Age: 32
End: 2022-12-06

## 2022-12-06 ENCOUNTER — TRANSCRIPTION ENCOUNTER (OUTPATIENT)
Age: 32
End: 2022-12-06

## 2022-12-08 NOTE — PLAN
[Integrative Therapy] : Integrative Therapy  [Psychodynamic Therapy] : Psychodynamic Therapy  [Recommended Frequency of Visits: ____] : Recommended frequency of visits: [unfilled] [Return in ____ week(s)] : Return in [unfilled] week(s) [FreeTextEntry2] : Goal A. Reduce symptoms of anxiety during pregnancy\par Objective A. Pt will utilize coping skills and strategies to more effectively manage anxiety. [de-identified] : Ms. Rodriguez arrived to session on time.  Ms. Rodriguez reported continued nausea and fatigue. Ms. Rodriguez discussed diagnosis of hyperemesis gravidarum and gestational diabetes.  Writer listened empathically and highlighted Ms. Rodriguez's very difficult experiences throughout pregnancy. Pt ended session in good behavioral and emotional control. [FreeTextEntry1] : Ms. Rodriguez will continue with weekly therapy. IAP to be reviewed 2/2023.

## 2022-12-09 ENCOUNTER — APPOINTMENT (OUTPATIENT)
Dept: OBGYN | Facility: CLINIC | Age: 32
End: 2022-12-09

## 2022-12-09 VITALS
SYSTOLIC BLOOD PRESSURE: 131 MMHG | WEIGHT: 216.9 LBS | HEART RATE: 94 BPM | OXYGEN SATURATION: 96 % | DIASTOLIC BLOOD PRESSURE: 81 MMHG | BODY MASS INDEX: 33.97 KG/M2

## 2022-12-09 PROCEDURE — 0502F SUBSEQUENT PRENATAL CARE: CPT

## 2022-12-10 LAB
ALBUMIN SERPL ELPH-MCNC: 3.9 G/DL
ALP BLD-CCNC: 138 U/L
ALT SERPL-CCNC: 11 U/L
ANION GAP SERPL CALC-SCNC: 13 MMOL/L
AST SERPL-CCNC: 18 U/L
BASOPHILS # BLD AUTO: 0.02 K/UL
BASOPHILS NFR BLD AUTO: 0.2 %
BILIRUB SERPL-MCNC: 0.5 MG/DL
BUN SERPL-MCNC: 7 MG/DL
CALCIUM SERPL-MCNC: 9.8 MG/DL
CHLORIDE SERPL-SCNC: 102 MMOL/L
CO2 SERPL-SCNC: 21 MMOL/L
CREAT SERPL-MCNC: 0.61 MG/DL
CREAT SPEC-SCNC: 238 MG/DL
CREAT/PROT UR: 0.2 RATIO
EGFR: 122 ML/MIN/1.73M2
EOSINOPHIL # BLD AUTO: 0.15 K/UL
EOSINOPHIL NFR BLD AUTO: 1.4 %
GLUCOSE SERPL-MCNC: 102 MG/DL
HCT VFR BLD CALC: 38.6 %
HGB BLD-MCNC: 11.8 G/DL
HIV1+2 AB SPEC QL IA.RAPID: NONREACTIVE
IMM GRANULOCYTES NFR BLD AUTO: 0.6 %
LDH SERPL-CCNC: 139 U/L
LYMPHOCYTES # BLD AUTO: 2.07 K/UL
LYMPHOCYTES NFR BLD AUTO: 20 %
MAN DIFF?: NORMAL
MCHC RBC-ENTMCNC: 27.9 PG
MCHC RBC-ENTMCNC: 30.6 GM/DL
MCV RBC AUTO: 91.3 FL
MONOCYTES # BLD AUTO: 0.59 K/UL
MONOCYTES NFR BLD AUTO: 5.7 %
NEUTROPHILS # BLD AUTO: 7.46 K/UL
NEUTROPHILS NFR BLD AUTO: 72.1 %
PLATELET # BLD AUTO: 377 K/UL
POTASSIUM SERPL-SCNC: 4.9 MMOL/L
PROT SERPL-MCNC: 7.1 G/DL
PROT UR-MCNC: 42 MG/DL
RBC # BLD: 4.23 M/UL
RBC # FLD: 15.2 %
SODIUM SERPL-SCNC: 136 MMOL/L
URATE SERPL-MCNC: 3.1 MG/DL
WBC # FLD AUTO: 10.35 K/UL

## 2022-12-11 LAB
GP B STREP DNA SPEC QL NAA+PROBE: NOT DETECTED
SOURCE GBS: NORMAL

## 2022-12-13 ENCOUNTER — APPOINTMENT (OUTPATIENT)
Dept: PSYCHIATRY | Facility: CLINIC | Age: 32
End: 2022-12-13

## 2022-12-14 ENCOUNTER — APPOINTMENT (OUTPATIENT)
Dept: OBGYN | Facility: CLINIC | Age: 32
End: 2022-12-14
Payer: COMMERCIAL

## 2022-12-14 ENCOUNTER — TRANSCRIPTION ENCOUNTER (OUTPATIENT)
Age: 32
End: 2022-12-14

## 2022-12-14 VITALS
SYSTOLIC BLOOD PRESSURE: 103 MMHG | OXYGEN SATURATION: 96 % | WEIGHT: 216 LBS | BODY MASS INDEX: 33.9 KG/M2 | HEART RATE: 114 BPM | HEIGHT: 67 IN | DIASTOLIC BLOOD PRESSURE: 71 MMHG

## 2022-12-14 PROCEDURE — 59425 ANTEPARTUM CARE ONLY: CPT

## 2022-12-14 PROCEDURE — 0502F SUBSEQUENT PRENATAL CARE: CPT

## 2022-12-14 RX ORDER — METOCLOPRAMIDE 5 MG/1
5 TABLET ORAL
Qty: 30 | Refills: 0 | Status: ACTIVE | COMMUNITY
Start: 2022-06-23 | End: 1900-01-01

## 2022-12-15 ENCOUNTER — TRANSCRIPTION ENCOUNTER (OUTPATIENT)
Age: 32
End: 2022-12-15

## 2022-12-16 ENCOUNTER — APPOINTMENT (OUTPATIENT)
Dept: OBGYN | Facility: CLINIC | Age: 32
End: 2022-12-16

## 2022-12-16 ENCOUNTER — ASOB RESULT (OUTPATIENT)
Age: 32
End: 2022-12-16

## 2022-12-16 ENCOUNTER — APPOINTMENT (OUTPATIENT)
Dept: ANTEPARTUM | Facility: CLINIC | Age: 32
End: 2022-12-16

## 2022-12-16 PROCEDURE — 76816 OB US FOLLOW-UP PER FETUS: CPT

## 2022-12-16 PROCEDURE — 76819 FETAL BIOPHYS PROFIL W/O NST: CPT

## 2022-12-20 ENCOUNTER — APPOINTMENT (OUTPATIENT)
Dept: PSYCHIATRY | Facility: CLINIC | Age: 32
End: 2022-12-20

## 2022-12-21 ENCOUNTER — NON-APPOINTMENT (OUTPATIENT)
Age: 32
End: 2022-12-21

## 2022-12-21 ENCOUNTER — TRANSCRIPTION ENCOUNTER (OUTPATIENT)
Age: 32
End: 2022-12-21

## 2022-12-21 ENCOUNTER — INPATIENT (INPATIENT)
Facility: HOSPITAL | Age: 32
LOS: 3 days | Discharge: HOME | End: 2022-12-25
Attending: OBSTETRICS & GYNECOLOGY | Admitting: OBSTETRICS & GYNECOLOGY
Payer: COMMERCIAL

## 2022-12-21 VITALS — WEIGHT: 218.26 LBS | HEIGHT: 67 IN

## 2022-12-21 DIAGNOSIS — O41.1230 CHORIOAMNIONITIS, THIRD TRIMESTER, NOT APPLICABLE OR UNSPECIFIED: ICD-10-CM

## 2022-12-21 LAB
BASOPHILS # BLD AUTO: 0.03 K/UL — SIGNIFICANT CHANGE UP (ref 0–0.2)
BASOPHILS NFR BLD AUTO: 0.3 % — SIGNIFICANT CHANGE UP (ref 0–2)
BLD GP AB SCN SERPL QL: NEGATIVE — SIGNIFICANT CHANGE UP
EOSINOPHIL # BLD AUTO: 0.06 K/UL — SIGNIFICANT CHANGE UP (ref 0–0.5)
EOSINOPHIL NFR BLD AUTO: 0.7 % — SIGNIFICANT CHANGE UP (ref 0–6)
GLUCOSE BLDC GLUCOMTR-MCNC: 105 MG/DL — HIGH (ref 70–99)
GLUCOSE BLDC GLUCOMTR-MCNC: 89 MG/DL — SIGNIFICANT CHANGE UP (ref 70–99)
HCT VFR BLD CALC: 34.5 % — SIGNIFICANT CHANGE UP (ref 34.5–45)
HGB BLD-MCNC: 11.2 G/DL — LOW (ref 11.5–15.5)
IMM GRANULOCYTES NFR BLD AUTO: 0.4 % — SIGNIFICANT CHANGE UP (ref 0–0.9)
LYMPHOCYTES # BLD AUTO: 1.88 K/UL — SIGNIFICANT CHANGE UP (ref 1–3.3)
LYMPHOCYTES # BLD AUTO: 20.8 % — SIGNIFICANT CHANGE UP (ref 13–44)
MCHC RBC-ENTMCNC: 27.9 PG — SIGNIFICANT CHANGE UP (ref 27–34)
MCHC RBC-ENTMCNC: 32.5 GM/DL — SIGNIFICANT CHANGE UP (ref 32–36)
MCV RBC AUTO: 86 FL — SIGNIFICANT CHANGE UP (ref 80–100)
MONOCYTES # BLD AUTO: 0.62 K/UL — SIGNIFICANT CHANGE UP (ref 0–0.9)
MONOCYTES NFR BLD AUTO: 6.8 % — SIGNIFICANT CHANGE UP (ref 2–14)
NEUTROPHILS # BLD AUTO: 6.43 K/UL — SIGNIFICANT CHANGE UP (ref 1.8–7.4)
NEUTROPHILS NFR BLD AUTO: 71 % — SIGNIFICANT CHANGE UP (ref 43–77)
NRBC # BLD: 0 /100 WBCS — SIGNIFICANT CHANGE UP (ref 0–0)
PLATELET # BLD AUTO: 391 K/UL — SIGNIFICANT CHANGE UP (ref 150–400)
RBC # BLD: 4.01 M/UL — SIGNIFICANT CHANGE UP (ref 3.8–5.2)
RBC # FLD: 14.6 % — HIGH (ref 10.3–14.5)
RH IG SCN BLD-IMP: POSITIVE — SIGNIFICANT CHANGE UP
RH IG SCN BLD-IMP: POSITIVE — SIGNIFICANT CHANGE UP
WBC # BLD: 9.06 K/UL — SIGNIFICANT CHANGE UP (ref 3.8–10.5)
WBC # FLD AUTO: 9.06 K/UL — SIGNIFICANT CHANGE UP (ref 3.8–10.5)

## 2022-12-21 PROCEDURE — 59515 CESAREAN DELIVERY: CPT

## 2022-12-21 RX ORDER — METOCLOPRAMIDE HCL 10 MG
10 TABLET ORAL THREE TIMES A DAY
Refills: 0 | Status: DISCONTINUED | OUTPATIENT
Start: 2022-12-21 | End: 2022-12-25

## 2022-12-21 RX ORDER — SERTRALINE 25 MG/1
50 TABLET, FILM COATED ORAL DAILY
Refills: 0 | Status: DISCONTINUED | OUTPATIENT
Start: 2022-12-21 | End: 2022-12-25

## 2022-12-21 RX ORDER — CHLORHEXIDINE GLUCONATE 213 G/1000ML
1 SOLUTION TOPICAL ONCE
Refills: 0 | Status: COMPLETED | OUTPATIENT
Start: 2022-12-21 | End: 2022-12-22

## 2022-12-21 RX ORDER — CITRIC ACID/SODIUM CITRATE 300-500 MG
15 SOLUTION, ORAL ORAL EVERY 6 HOURS
Refills: 0 | Status: DISCONTINUED | OUTPATIENT
Start: 2022-12-21 | End: 2022-12-22

## 2022-12-21 RX ORDER — OXYTOCIN 10 UNIT/ML
333.33 VIAL (ML) INJECTION
Qty: 20 | Refills: 0 | Status: DISCONTINUED | OUTPATIENT
Start: 2022-12-21 | End: 2022-12-22

## 2022-12-21 RX ORDER — OXYTOCIN 10 UNIT/ML
1 VIAL (ML) INJECTION
Qty: 30 | Refills: 0 | Status: DISCONTINUED | OUTPATIENT
Start: 2022-12-21 | End: 2022-12-22

## 2022-12-21 RX ORDER — SODIUM CHLORIDE 9 MG/ML
1000 INJECTION, SOLUTION INTRAVENOUS
Refills: 0 | Status: DISCONTINUED | OUTPATIENT
Start: 2022-12-21 | End: 2022-12-22

## 2022-12-21 RX ORDER — DIPHENHYDRAMINE HCL 50 MG
25 CAPSULE ORAL ONCE
Refills: 0 | Status: COMPLETED | OUTPATIENT
Start: 2022-12-21 | End: 2022-12-21

## 2022-12-21 RX ORDER — SODIUM CHLORIDE 9 MG/ML
1000 INJECTION, SOLUTION INTRAVENOUS
Refills: 0 | Status: DISCONTINUED | OUTPATIENT
Start: 2022-12-21 | End: 2022-12-25

## 2022-12-21 RX ADMIN — SERTRALINE 50 MILLIGRAM(S): 25 TABLET, FILM COATED ORAL at 23:48

## 2022-12-21 RX ADMIN — Medication 10 MILLIGRAM(S): at 23:48

## 2022-12-21 RX ADMIN — Medication 1 MILLIUNIT(S)/MIN: at 22:05

## 2022-12-21 NOTE — PATIENT PROFILE OB - FALL HARM RISK - UNIVERSAL INTERVENTIONS
Bed in lowest position, wheels locked, appropriate side rails in place/Call bell, personal items and telephone in reach/Instruct patient to call for assistance before getting out of bed or chair/Non-slip footwear when patient is out of bed/[place] to call system/Physically safe environment - no spills, clutter or unnecessary equipment/Purposeful Proactive Rounding/Room/bathroom lighting operational, light cord in reach

## 2022-12-22 ENCOUNTER — RESULT REVIEW (OUTPATIENT)
Age: 32
End: 2022-12-22

## 2022-12-22 LAB
COVID-19 SPIKE DOMAIN AB INTERP: POSITIVE
COVID-19 SPIKE DOMAIN ANTIBODY RESULT: >250 U/ML — HIGH
GLUCOSE BLDC GLUCOMTR-MCNC: 100 MG/DL — HIGH (ref 70–99)
GLUCOSE BLDC GLUCOMTR-MCNC: 105 MG/DL — HIGH (ref 70–99)
GLUCOSE BLDC GLUCOMTR-MCNC: 76 MG/DL — SIGNIFICANT CHANGE UP (ref 70–99)
GLUCOSE BLDC GLUCOMTR-MCNC: 83 MG/DL — SIGNIFICANT CHANGE UP (ref 70–99)
GLUCOSE BLDC GLUCOMTR-MCNC: 84 MG/DL — SIGNIFICANT CHANGE UP (ref 70–99)
GLUCOSE BLDC GLUCOMTR-MCNC: 94 MG/DL — SIGNIFICANT CHANGE UP (ref 70–99)
SARS-COV-2 IGG+IGM SERPL QL IA: >250 U/ML — HIGH
SARS-COV-2 IGG+IGM SERPL QL IA: POSITIVE
SARS-COV-2 N GENE NPH QL NAA+PROBE: NOT DETECTED
T PALLIDUM AB TITR SER: NEGATIVE — SIGNIFICANT CHANGE UP

## 2022-12-22 PROCEDURE — 88307 TISSUE EXAM BY PATHOLOGIST: CPT | Mod: 26

## 2022-12-22 PROCEDURE — 88304 TISSUE EXAM BY PATHOLOGIST: CPT | Mod: 26

## 2022-12-22 RX ORDER — OXYCODONE HYDROCHLORIDE 5 MG/1
5 TABLET ORAL
Refills: 0 | Status: DISCONTINUED | OUTPATIENT
Start: 2022-12-22 | End: 2022-12-25

## 2022-12-22 RX ORDER — ONDANSETRON 8 MG/1
4 TABLET, FILM COATED ORAL EVERY 6 HOURS
Refills: 0 | Status: DISCONTINUED | OUTPATIENT
Start: 2022-12-22 | End: 2022-12-25

## 2022-12-22 RX ORDER — ENOXAPARIN SODIUM 100 MG/ML
40 INJECTION SUBCUTANEOUS ONCE
Refills: 0 | Status: COMPLETED | OUTPATIENT
Start: 2022-12-23 | End: 2022-12-23

## 2022-12-22 RX ORDER — FAMOTIDINE 10 MG/ML
20 INJECTION INTRAVENOUS ONCE
Refills: 0 | Status: COMPLETED | OUTPATIENT
Start: 2022-12-22 | End: 2022-12-22

## 2022-12-22 RX ORDER — CHLORHEXIDINE GLUCONATE 213 G/1000ML
1 SOLUTION TOPICAL DAILY
Refills: 0 | Status: DISCONTINUED | OUTPATIENT
Start: 2022-12-22 | End: 2022-12-25

## 2022-12-22 RX ORDER — SIMETHICONE 80 MG/1
80 TABLET, CHEWABLE ORAL EVERY 4 HOURS
Refills: 0 | Status: DISCONTINUED | OUTPATIENT
Start: 2022-12-22 | End: 2022-12-25

## 2022-12-22 RX ORDER — ACETAMINOPHEN 500 MG
975 TABLET ORAL
Refills: 0 | Status: DISCONTINUED | OUTPATIENT
Start: 2022-12-22 | End: 2022-12-25

## 2022-12-22 RX ORDER — OXYCODONE HYDROCHLORIDE 5 MG/1
5 TABLET ORAL ONCE
Refills: 0 | Status: DISCONTINUED | OUTPATIENT
Start: 2022-12-22 | End: 2022-12-25

## 2022-12-22 RX ORDER — NALOXONE HYDROCHLORIDE 4 MG/.1ML
0.1 SPRAY NASAL
Refills: 0 | Status: DISCONTINUED | OUTPATIENT
Start: 2022-12-22 | End: 2022-12-25

## 2022-12-22 RX ORDER — IBUPROFEN 200 MG
600 TABLET ORAL EVERY 6 HOURS
Refills: 0 | Status: COMPLETED | OUTPATIENT
Start: 2022-12-22 | End: 2023-11-20

## 2022-12-22 RX ORDER — MAGNESIUM HYDROXIDE 400 MG/1
30 TABLET, CHEWABLE ORAL
Refills: 0 | Status: DISCONTINUED | OUTPATIENT
Start: 2022-12-22 | End: 2022-12-25

## 2022-12-22 RX ORDER — SODIUM CHLORIDE 9 MG/ML
1000 INJECTION, SOLUTION INTRAVENOUS
Refills: 0 | Status: DISCONTINUED | OUTPATIENT
Start: 2022-12-22 | End: 2022-12-25

## 2022-12-22 RX ORDER — TETANUS TOXOID, REDUCED DIPHTHERIA TOXOID AND ACELLULAR PERTUSSIS VACCINE, ADSORBED 5; 2.5; 8; 8; 2.5 [IU]/.5ML; [IU]/.5ML; UG/.5ML; UG/.5ML; UG/.5ML
0.5 SUSPENSION INTRAMUSCULAR ONCE
Refills: 0 | Status: DISCONTINUED | OUTPATIENT
Start: 2022-12-22 | End: 2022-12-25

## 2022-12-22 RX ORDER — KETOROLAC TROMETHAMINE 30 MG/ML
30 SYRINGE (ML) INJECTION EVERY 6 HOURS
Refills: 0 | Status: DISCONTINUED | OUTPATIENT
Start: 2022-12-22 | End: 2022-12-23

## 2022-12-22 RX ORDER — OXYTOCIN 10 UNIT/ML
333.33 VIAL (ML) INJECTION
Qty: 20 | Refills: 0 | Status: DISCONTINUED | OUTPATIENT
Start: 2022-12-22 | End: 2022-12-25

## 2022-12-22 RX ORDER — CEFAZOLIN SODIUM 1 G
2000 VIAL (EA) INJECTION ONCE
Refills: 0 | Status: COMPLETED | OUTPATIENT
Start: 2022-12-22 | End: 2022-12-22

## 2022-12-22 RX ORDER — FENTANYL/BUPIVACAINE/NS/PF 2MCG/ML-.1
250 PLASTIC BAG, INJECTION (ML) INJECTION
Refills: 0 | Status: DISCONTINUED | OUTPATIENT
Start: 2022-12-22 | End: 2022-12-22

## 2022-12-22 RX ORDER — CITRIC ACID/SODIUM CITRATE 300-500 MG
30 SOLUTION, ORAL ORAL ONCE
Refills: 0 | Status: DISCONTINUED | OUTPATIENT
Start: 2022-12-22 | End: 2022-12-25

## 2022-12-22 RX ORDER — DIPHENHYDRAMINE HCL 50 MG
25 CAPSULE ORAL EVERY 6 HOURS
Refills: 0 | Status: DISCONTINUED | OUTPATIENT
Start: 2022-12-22 | End: 2022-12-25

## 2022-12-22 RX ORDER — LANOLIN
1 OINTMENT (GRAM) TOPICAL EVERY 6 HOURS
Refills: 0 | Status: DISCONTINUED | OUTPATIENT
Start: 2022-12-22 | End: 2022-12-25

## 2022-12-22 RX ORDER — DEXAMETHASONE 0.5 MG/5ML
4 ELIXIR ORAL EVERY 6 HOURS
Refills: 0 | Status: DISCONTINUED | OUTPATIENT
Start: 2022-12-22 | End: 2022-12-25

## 2022-12-22 RX ORDER — AZITHROMYCIN 500 MG/1
500 TABLET, FILM COATED ORAL ONCE
Refills: 0 | Status: COMPLETED | OUTPATIENT
Start: 2022-12-22 | End: 2022-12-22

## 2022-12-22 RX ADMIN — Medication 30 MILLIGRAM(S): at 21:28

## 2022-12-22 RX ADMIN — FAMOTIDINE 20 MILLIGRAM(S): 10 INJECTION INTRAVENOUS at 19:13

## 2022-12-22 RX ADMIN — Medication 100 MILLIGRAM(S): at 19:08

## 2022-12-22 RX ADMIN — CHLORHEXIDINE GLUCONATE 1 APPLICATION(S): 213 SOLUTION TOPICAL at 18:52

## 2022-12-22 RX ADMIN — Medication 10 MILLIGRAM(S): at 08:24

## 2022-12-22 RX ADMIN — AZITHROMYCIN 255 MILLIGRAM(S): 500 TABLET, FILM COATED ORAL at 19:08

## 2022-12-22 RX ADMIN — Medication 0.25 MILLIGRAM(S): at 16:42

## 2022-12-22 NOTE — PRE-ANESTHESIA EVALUATION ADULT - NSANTHAPLANRD_GEN_ALL_CORE
Refill Approved    Rx renewed per Medication Renewal Policy. Medication was last renewed on 6/7/19.    Rebecca Christopher, [facility] Connection Triage/Med Refill 3/25/2020     Requested Prescriptions   Pending Prescriptions Disp Refills     omeprazole (PRILOSEC) 40 MG capsule [Pharmacy Med Name: OMEPRAZOLE DR 40 MG CAPSULE] 90 capsule 3     Sig: TAKE 1 CAPSULE BY MOUTH EVERY DAY       GI Medications Refill Protocol Passed - 3/23/2020  6:36 PM        Passed - PCP or prescribing provider visit in last 12 or next 3 months.     Last office visit with prescriber/PCP: 5/9/2019 Kayla Gomez MD OR same dept: 8/8/2019 Kenia Pereyra MD OR same specialty: 8/8/2019 Kenia Pereyra MD  Last physical: Visit date not found Last MTM visit: Visit date not found   Next visit within 3 mo: Visit date not found  Next physical within 3 mo: Visit date not found  Prescriber OR PCP: Kayla Gomez MD  Last diagnosis associated with med order: 1. Gastroesophageal reflux disease without esophagitis  - omeprazole (PRILOSEC) 40 MG capsule [Pharmacy Med Name: OMEPRAZOLE DR 40 MG CAPSULE]; TAKE 1 CAPSULE BY MOUTH EVERY DAY  Dispense: 90 capsule; Refill: 3    If protocol passes may refill for 12 months if within 3 months of last provider visit (or a total of 15 months).                             regional

## 2022-12-23 ENCOUNTER — TRANSCRIPTION ENCOUNTER (OUTPATIENT)
Age: 32
End: 2022-12-23

## 2022-12-23 LAB
BASOPHILS # BLD AUTO: 0.02 K/UL — SIGNIFICANT CHANGE UP (ref 0–0.2)
BASOPHILS NFR BLD AUTO: 0.2 % — SIGNIFICANT CHANGE UP (ref 0–2)
EOSINOPHIL # BLD AUTO: 0.01 K/UL — SIGNIFICANT CHANGE UP (ref 0–0.5)
EOSINOPHIL NFR BLD AUTO: 0.1 % — SIGNIFICANT CHANGE UP (ref 0–6)
HCT VFR BLD CALC: 25.4 % — LOW (ref 34.5–45)
HGB BLD-MCNC: 8 G/DL — LOW (ref 11.5–15.5)
IMM GRANULOCYTES NFR BLD AUTO: 0.4 % — SIGNIFICANT CHANGE UP (ref 0–0.9)
LYMPHOCYTES # BLD AUTO: 1.54 K/UL — SIGNIFICANT CHANGE UP (ref 1–3.3)
LYMPHOCYTES # BLD AUTO: 12.6 % — LOW (ref 13–44)
MCHC RBC-ENTMCNC: 27.4 PG — SIGNIFICANT CHANGE UP (ref 27–34)
MCHC RBC-ENTMCNC: 31.5 GM/DL — LOW (ref 32–36)
MCV RBC AUTO: 87 FL — SIGNIFICANT CHANGE UP (ref 80–100)
MONOCYTES # BLD AUTO: 0.86 K/UL — SIGNIFICANT CHANGE UP (ref 0–0.9)
MONOCYTES NFR BLD AUTO: 7.1 % — SIGNIFICANT CHANGE UP (ref 2–14)
NEUTROPHILS # BLD AUTO: 9.71 K/UL — HIGH (ref 1.8–7.4)
NEUTROPHILS NFR BLD AUTO: 79.6 % — HIGH (ref 43–77)
NRBC # BLD: 0 /100 WBCS — SIGNIFICANT CHANGE UP (ref 0–0)
PLATELET # BLD AUTO: 254 K/UL — SIGNIFICANT CHANGE UP (ref 150–400)
RBC # BLD: 2.92 M/UL — LOW (ref 3.8–5.2)
RBC # FLD: 15 % — HIGH (ref 10.3–14.5)
WBC # BLD: 12.19 K/UL — HIGH (ref 3.8–10.5)
WBC # FLD AUTO: 12.19 K/UL — HIGH (ref 3.8–10.5)

## 2022-12-23 RX ORDER — IBUPROFEN 200 MG
600 TABLET ORAL EVERY 6 HOURS
Refills: 0 | Status: DISCONTINUED | OUTPATIENT
Start: 2022-12-23 | End: 2022-12-25

## 2022-12-23 RX ADMIN — SERTRALINE 50 MILLIGRAM(S): 25 TABLET, FILM COATED ORAL at 21:44

## 2022-12-23 RX ADMIN — Medication 30 MILLIGRAM(S): at 06:53

## 2022-12-23 RX ADMIN — Medication 600 MILLIGRAM(S): at 18:14

## 2022-12-23 RX ADMIN — SERTRALINE 50 MILLIGRAM(S): 25 TABLET, FILM COATED ORAL at 00:19

## 2022-12-23 RX ADMIN — Medication 30 MILLIGRAM(S): at 06:23

## 2022-12-23 RX ADMIN — ENOXAPARIN SODIUM 40 MILLIGRAM(S): 100 INJECTION SUBCUTANEOUS at 09:32

## 2022-12-23 RX ADMIN — Medication 975 MILLIGRAM(S): at 03:30

## 2022-12-23 RX ADMIN — Medication 975 MILLIGRAM(S): at 15:21

## 2022-12-23 RX ADMIN — Medication 975 MILLIGRAM(S): at 22:18

## 2022-12-23 RX ADMIN — Medication 975 MILLIGRAM(S): at 03:00

## 2022-12-23 RX ADMIN — Medication 975 MILLIGRAM(S): at 21:44

## 2022-12-23 RX ADMIN — Medication 975 MILLIGRAM(S): at 10:30

## 2022-12-23 RX ADMIN — Medication 600 MILLIGRAM(S): at 23:58

## 2022-12-23 RX ADMIN — Medication 975 MILLIGRAM(S): at 09:32

## 2022-12-23 NOTE — DISCHARGE NOTE OB - HOSPITAL COURSE
Patient came in for IOL, and had arrest of dilation as well as NRFHT. Patient agreed to  section. Uneventful postoperative course, and meeting milestones.

## 2022-12-23 NOTE — DISCHARGE NOTE OB - NS MD DC FALL RISK RISK
For information on Fall & Injury Prevention, visit: [email or web address] OR  [email or web address] OR  https://www.cdc.gov/steadi/patient.html

## 2022-12-23 NOTE — DISCHARGE NOTE OB - MEDICATION SUMMARY - MEDICATIONS TO TAKE
I will START or STAY ON the medications listed below when I get home from the hospital:    Reglan 10 mg oral tablet  -- 1 tab(s) by mouth every 6 hours PRN N/V  -- It is very important that you take or use this exactly as directed.  Do not skip doses or discontinue unless directed by your doctor.  May cause drowsiness or dizziness.  Take medication on an empty stomach 1 hour before or 2 to 3 hours after a meal unless otherwise directed by your doctor.    -- Indication: For nausea

## 2022-12-23 NOTE — DISCHARGE NOTE OB - PATIENT PORTAL LINK FT
You can access the FollowMyHealth Patient Portal offered by [facility] by registering at the following website: [email or web address]. By joining [employer]’s FollowMyHealth portal, you will also be able to view your health information using other applications (apps) compatible with our system.

## 2022-12-23 NOTE — LACTATION INITIAL EVALUATION - NS LACT CON REASON FOR REQ
Infant remains in NICU today. Mother states she is tired and will eventually begin colostrum collection but wants to sleep at this time. Mother has been advised to begin milk collection asap and to pump and/or hand express q 8 x over 24 hours including at least once over night. Primary nurse updated on pt status and plan and will provide pt with pump education and set up upon pt request./general questions without assessment/pump request/primaparous mom/staff request/NICU admission

## 2022-12-23 NOTE — DISCHARGE NOTE OB - CARE PLAN
1 Principal Discharge DX:	Single delivery by  section  Assessment and plan of treatment:	 delivery, meeting all postoperative milestones.  Please follow-up with your OB doctor within 1-2 weeks.  You can resume a regular diet at home and may continue your prenatal vitamins as directed.  Please place nothing in the vagina for 6 weeks (no tampons, sex, douching, tub baths, swimming pools, etc).  If you have severe headaches and/or vision changes, heavy bleeding, or chest pain, please call your provider or go to the nearest Emergency Department.  Please call your OB with any signs of symptoms of infection including fever > 100.4 degrees, severe pain, malodorous vaginal discharge or heavy bleeding requiring more than 1-2 pads/hour.  You can take Motrin 600mg orally every 6 hours for pain as needed.

## 2022-12-23 NOTE — DISCHARGE NOTE OB - MEDICATION SUMMARY - MEDICATIONS TO STOP TAKING
I will STOP taking the medications listed below when I get home from the hospital:    cephalexin 500 mg oral capsule  -- 1 cap(s) by mouth 4 times a day  -- Finish all this medication unless otherwise directed by prescriber.    Reglan 10 mg oral tablet  -- 1 tab(s) by mouth every 6 hours PRN N/V  -- It is very important that you take or use this exactly as directed.  Do not skip doses or discontinue unless directed by your doctor.  May cause drowsiness or dizziness.  Take medication on an empty stomach 1 hour before or 2 to 3 hours after a meal unless otherwise directed by your doctor.

## 2022-12-23 NOTE — DISCHARGE NOTE OB - CARE PROVIDER_API CALL
Malick Rosales)  MaternalFetal Medicine; Obstetrics and Gynecology  [facility] - Dept of OB/GYN, [address] [place] [address]  Phone: [phone]  Fax: [phone]  Established Patient  Follow Up Time: 2 weeks

## 2022-12-24 RX ORDER — ENOXAPARIN SODIUM 100 MG/ML
40 INJECTION SUBCUTANEOUS EVERY 24 HOURS
Refills: 0 | Status: DISCONTINUED | OUTPATIENT
Start: 2022-12-24 | End: 2022-12-25

## 2022-12-24 RX ADMIN — SERTRALINE 50 MILLIGRAM(S): 25 TABLET, FILM COATED ORAL at 21:15

## 2022-12-24 RX ADMIN — Medication 975 MILLIGRAM(S): at 15:34

## 2022-12-24 RX ADMIN — Medication 975 MILLIGRAM(S): at 10:40

## 2022-12-24 RX ADMIN — Medication 975 MILLIGRAM(S): at 03:20

## 2022-12-24 RX ADMIN — Medication 600 MILLIGRAM(S): at 23:43

## 2022-12-24 RX ADMIN — ENOXAPARIN SODIUM 40 MILLIGRAM(S): 100 INJECTION SUBCUTANEOUS at 10:56

## 2022-12-24 RX ADMIN — Medication 600 MILLIGRAM(S): at 17:31

## 2022-12-24 RX ADMIN — Medication 975 MILLIGRAM(S): at 22:15

## 2022-12-24 RX ADMIN — Medication 600 MILLIGRAM(S): at 07:18

## 2022-12-24 RX ADMIN — Medication 975 MILLIGRAM(S): at 21:15

## 2022-12-25 VITALS
OXYGEN SATURATION: 98 % | RESPIRATION RATE: 18 BRPM | DIASTOLIC BLOOD PRESSURE: 77 MMHG | SYSTOLIC BLOOD PRESSURE: 120 MMHG | TEMPERATURE: 99 F | HEART RATE: 84 BPM

## 2022-12-25 PROCEDURE — 36415 COLL VENOUS BLD VENIPUNCTURE: CPT

## 2022-12-25 PROCEDURE — 86901 BLOOD TYPING SEROLOGIC RH(D): CPT

## 2022-12-25 PROCEDURE — 86850 RBC ANTIBODY SCREEN: CPT

## 2022-12-25 PROCEDURE — 86780 TREPONEMA PALLIDUM: CPT

## 2022-12-25 PROCEDURE — 86769 SARS-COV-2 COVID-19 ANTIBODY: CPT

## 2022-12-25 PROCEDURE — 88307 TISSUE EXAM BY PATHOLOGIST: CPT

## 2022-12-25 PROCEDURE — 59050 FETAL MONITOR W/REPORT: CPT

## 2022-12-25 PROCEDURE — 85025 COMPLETE CBC W/AUTO DIFF WBC: CPT

## 2022-12-25 PROCEDURE — 86900 BLOOD TYPING SEROLOGIC ABO: CPT

## 2022-12-25 PROCEDURE — 82962 GLUCOSE BLOOD TEST: CPT

## 2022-12-25 PROCEDURE — 88304 TISSUE EXAM BY PATHOLOGIST: CPT

## 2022-12-25 RX ADMIN — Medication 975 MILLIGRAM(S): at 08:59

## 2022-12-25 RX ADMIN — Medication 600 MILLIGRAM(S): at 11:52

## 2022-12-25 RX ADMIN — Medication 600 MILLIGRAM(S): at 06:49

## 2022-12-25 RX ADMIN — Medication 975 MILLIGRAM(S): at 15:18

## 2022-12-25 RX ADMIN — ENOXAPARIN SODIUM 40 MILLIGRAM(S): 100 INJECTION SUBCUTANEOUS at 08:59

## 2022-12-25 NOTE — PROGRESS NOTE ADULT - SUBJECTIVE AND OBJECTIVE BOX
Patient evaluated at bedside this morning, resting comfortable in bed.   She reports pain is well controlled with oral pain medications.   She denies headache, dizziness, chest pain, palpitations, shortness of breath, nausea, vomiting or heavy vaginal bleeding.  She has been ambulating without assistance, voiding spontaneously, passing gas, tolerating regular diet.     Physical Exam:  Vital Signs Last 24 Hrs  T(C): 36.4 (24 Dec 2022 06:00), Max: 37.2 (23 Dec 2022 21:32)  T(F): 97.5 (24 Dec 2022 06:00), Max: 98.9 (23 Dec 2022 21:32)  HR: 70 (24 Dec 2022 06:00) (69 - 76)  BP: 103/68 (24 Dec 2022 06:00) (98/62 - 103/68)  BP(mean): 80 (23 Dec 2022 21:32) (80 - 80)  RR: 19 (24 Dec 2022 06:00) (18 - 19)  SpO2: 99% (24 Dec 2022 06:00) (96% - 99%)    Parameters below as of 24 Dec 2022 06:00  Patient On (Oxygen Delivery Method): room air        GA: NAD, A+0 x 3  Pulm: no increased WOB  Abd: soft, nontender, nondistended, no rebound or guarding, incision clean, dry and intact, uterus firm at midline,2  fb below umbilicus  Extremities: no swelling or calf tenderness                             8.0    12.19 )-----------( 254      ( 23 Dec 2022 05:30 )             25.4               
Patient evaluated at bedside.   She reports pain is well controlled with OPM.  She has been ambulating without assistance, voiding spontaneously, passing gas, tolerating regular diet.  She denies HA, dizziness, CP, palpitations, SOB, n/v, or heavy vaginal bleeding.    Physical Exam:  Vital Signs Last 24 Hrs  T(C): 36.6 (25 Dec 2022 02:01), Max: 37.4 (24 Dec 2022 14:00)  T(F): 97.9 (25 Dec 2022 02:01), Max: 99.4 (24 Dec 2022 14:00)  HR: 72 (25 Dec 2022 02:01) (72 - 81)  BP: 111/71 (25 Dec 2022 02:01) (108/65 - 114/77)  BP(mean): --  RR: 17 (25 Dec 2022 02:01) (17 - 18)  SpO2: 98% (25 Dec 2022 02:01) (98% - 98%)    Parameters below as of 24 Dec 2022 18:10  Patient On (Oxygen Delivery Method): room air        Gen: NAD  Abd: + BS, soft, nontender, nondistended, no rebound or guarding  Incision clean, dry and intact  uterus firm at midline  : lochia WNL  Extremities: no swelling or calf tenderness                
Patient evaluated at bedside this morning, resting comfortable in bed, with no acute events overnight.  She reports pain is well controlled.  She denies headache, dizziness, chest pain, palpitations, shortness of breathe, nausea, vomiting or heavy vaginal bleeding.  She has not tried ambulating since procedure, jordan has not yet been removed.  Tolerating clear liquids.     Physical Exam:  Vital Signs Last 24 Hrs  T(C): 36.8 (23 Dec 2022 06:28), Max: 37.3 (22 Dec 2022 22:15)  T(F): 98.2 (23 Dec 2022 06:28), Max: 99.1 (22 Dec 2022 22:15)  HR: 74 (23 Dec 2022 06:28) (68 - 96)  BP: 104/65 (23 Dec 2022 06:28) (15/66 - 106/68)  BP(mean): 79 (22 Dec 2022 22:55) (72 - 79)  RR: 18 (23 Dec 2022 06:28) (17 - 18)  SpO2: 97% (23 Dec 2022 06:28) (89% - 100%)    Parameters below as of 23 Dec 2022 06:28  Patient On (Oxygen Delivery Method): room air        GA: NAD, A+0 x 3  Abd: soft, nontender, nondistended, no rebound or guarding, incision clean, dry and intact, uterus firm at midline, 1 fb below umbilicus  :  lochia WNL  Extremities: no swelling or calf tenderness, reflexes +2 bilaterally.                            8.0    12.19 )-----------( 254      ( 23 Dec 2022 05:30 )             25.4               acetaminophen     Tablet .. 975 milliGRAM(s) Oral <User Schedule>  chlorhexidine 2% Cloths 1 Application(s) Topical daily  citric acid/sodium citrate Solution 30 milliLiter(s) Oral once  dexAMETHasone  Injectable 4 milliGRAM(s) IV Push every 6 hours PRN  dextrose 5% + lactated ringers. 1000 milliLiter(s) IV Continuous <Continuous>  diphenhydrAMINE 25 milliGRAM(s) Oral every 6 hours PRN  diphtheria/tetanus/pertussis (acellular) Vaccine (Adacel) 0.5 milliLiter(s) IntraMuscular once  enoxaparin Injectable 40 milliGRAM(s) SubCutaneous once  fentanyl (2 MICROgram(s)/mL) + bupivacaine 0.0625%  in 0.9% Sodium Chloride PCEA 250 milliLiter(s) Epidural PCA Continuous  ibuprofen  Tablet. 600 milliGRAM(s) Oral every 6 hours  ketorolac   Injectable 30 milliGRAM(s) IV Push every 6 hours  lactated ringers. 1000 milliLiter(s) IV Continuous <Continuous>  lanolin Ointment 1 Application(s) Topical every 6 hours PRN  magnesium hydroxide Suspension 30 milliLiter(s) Oral two times a day PRN  metoclopramide 10 milliGRAM(s) Oral three times a day  naloxone Injectable 0.1 milliGRAM(s) IV Push every 3 minutes PRN  ondansetron Injectable 4 milliGRAM(s) IV Push every 6 hours PRN  oxyCODONE    IR 5 milliGRAM(s) Oral every 3 hours PRN  oxyCODONE    IR 5 milliGRAM(s) Oral once PRN  oxytocin Infusion 333.333 milliUNIT(s)/Min IV Continuous <Continuous>  sertraline 50 milliGRAM(s) Oral daily  simethicone 80 milliGRAM(s) Chew every 4 hours PRN

## 2022-12-25 NOTE — PROGRESS NOTE ADULT - ASSESSMENT
A/P   32y  s/p  section, POD #1, stable  -  Pain: PO motrin q6h, Tylenol q6h, oxycodone for severe pain PRN  -  Post-operatively labs: post-op Hgb  -  GI: tolerating clears, passing gas, ADAT  -  : jordan to be removed and today and plan to f/u TOV  -  DVT prophylaxis: ambulation, SCDs, Lovenox  -  Dispo: POD 3 or 4    
32y Female POD#3 s/p C/S, Uncomplicated                                       - Neuro/Pain: motrin atc, tylenol atc, oxy prn  - CV:  VS per routine  - Pulm: Encourage ISS & Ambulation  - GI: Advance as tolerated  - : Voiding spontaneously  - DVT ppx: SCDs, Lovenox 40mg QD  - Dispo: POD #3
A/P: 32y s/p  section, POD#2, stable  -  Pain: PO motrin q6hrs, tylenol q8hrs, oxycodone for severe pain PRN  -  Post-operatively labs: post-op Hgb , hemodynamically stable, no symptoms of anemia   -  GI: tolerating regular diet, passing gas  -  : s/p jordan , urinating without difficulty  -  DVT prophylaxis: encouraged increased ambulation, SCDs, SQL  -  Dispo: POD 3 or 4

## 2022-12-27 ENCOUNTER — APPOINTMENT (OUTPATIENT)
Dept: PSYCHIATRY | Facility: CLINIC | Age: 32
End: 2022-12-27

## 2022-12-28 ENCOUNTER — NON-APPOINTMENT (OUTPATIENT)
Age: 32
End: 2022-12-28

## 2022-12-28 ENCOUNTER — APPOINTMENT (OUTPATIENT)
Dept: PSYCHIATRY | Facility: CLINIC | Age: 32
End: 2022-12-28

## 2022-12-28 PROCEDURE — 99214 OFFICE O/P EST MOD 30 MIN: CPT | Mod: 95

## 2022-12-28 RX ORDER — SERTRALINE 25 MG/1
25 TABLET, FILM COATED ORAL
Qty: 42 | Refills: 2 | Status: DISCONTINUED | COMMUNITY
Start: 2022-08-02 | End: 2022-12-28
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
## 2022-12-31 ENCOUNTER — NON-APPOINTMENT (OUTPATIENT)
Age: 32
End: 2022-12-31

## 2023-01-03 ENCOUNTER — APPOINTMENT (OUTPATIENT)
Dept: PSYCHIATRY | Facility: CLINIC | Age: 33
End: 2023-01-03

## 2023-01-04 ENCOUNTER — APPOINTMENT (OUTPATIENT)
Dept: OBGYN | Facility: CLINIC | Age: 33
End: 2023-01-04
Payer: COMMERCIAL

## 2023-01-04 ENCOUNTER — APPOINTMENT (OUTPATIENT)
Dept: PSYCHIATRY | Facility: CLINIC | Age: 33
End: 2023-01-04
Payer: COMMERCIAL

## 2023-01-04 VITALS
OXYGEN SATURATION: 98 % | DIASTOLIC BLOOD PRESSURE: 78 MMHG | BODY MASS INDEX: 32.73 KG/M2 | HEART RATE: 97 BPM | SYSTOLIC BLOOD PRESSURE: 116 MMHG | WEIGHT: 209 LBS

## 2023-01-04 PROCEDURE — 99214 OFFICE O/P EST MOD 30 MIN: CPT | Mod: 95

## 2023-01-04 PROCEDURE — 0503F POSTPARTUM CARE VISIT: CPT
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
## 2023-01-04 NOTE — DISCUSSION/SUMMARY
[FreeTextEntry1] : 32 year old cis-gendered, heterosexual [place] American female, lives with her  in [place], works as  at [employer], currently on leave 2’ being 23 wks pregnant, due 2023, PPH of depression, anxiety, ADHD, h/o cutting in high school, in therapy since age 5 on and off, numerous medication trials, specifically Zoloft and paxil for anxiety, restarted on Zoloft 50mg at beginning of pregnancy for anxiety, denies substance abuse, h/o smoking, \par Referred by OBGYN, presents for assessment and treatment of increased anxiety during pregnancy.\par Pt found out she was pregnant in 2022.  This is her first pregnancy, no children, no FH of  depression or anxiety, planned pregnancy, on ST disability secondary to morning sickness, happy to be growing a child. Plans to breastfeed.  Unsure if she will cont to work after pregnancy.  In-laws will help with childcare. Last gyn exam was 2 wks.  LMP 3/30, FMP age 14.  \par She describes feelings of apathy, amotivation, catastrophizing, frequent crying spells, excessive worries, ruminations, restlessness, and guilt about fears of negatively impacting her pregnancy.  She states anxiety comes daily but is not constant.  No panic attacks, last PA was 4 years ago.  Denies SI/HI/AH/VH.  Denies h/o manic episodes, denies hypersexuality, increased spending, decreased need for sleep, impulsive behaviors.  Presents with euthymic mood.   \par PPH – SIB (cutting) in high school, has written suicide letters and had pills ready when in high school.  Since then no attempts, preparatory acts or SIB.  Aggressive behaviors in middle school, states didn’t know how to show affection or love at that time.  No aggressive behaviors since then.  Therapy since age 5, from [age] longest, on Zoloft, then Paxil for anxiety.  From [age], -, numerous medication trials including Sapphris, Wellbutrin (weight gain), Klonopin, Xanax, Vyvanse, Adderall (weight loss), propranolol, Paxil, Zoloft.  Has been treated for anxiety, depression, ADHD ( based off of restlessness, low concentration, energy, and trouble sleeping) and bipolar dx (based off “slut phase at age 20 - One prior provider suggested she may have bipolar disorder in the past.  Patient denies s/s of prior manic episodes and states she doesn’t think she has it.) No meds since age 25.  Restarted therapy at beginning of pregnancy.  Denies past psychiatric hospitalizations.  \par SUBS – alcohol once a year, none during pregnancy, h/o smoking age [age], pack a day, not smoking now.\par MEDS- Zoloft 50mg daily since 2022 during pregnancy, prescribed by OBGYN.  No side effects.  \par ALL- soy\par FH – coiusin with anxiety/depression, no FH of suicide, biological father abused opiates.\par SH- lives with , college – BS in [employer], identifies as Confucianist  American, raised in [place], no developmental delays, public school, As & Bs, no learning disabilities noted, parents  when she was 3years old, moved around a lot with various family members, stepfather involved in life, close to him, limited interactions with her biological father, childhood neglect when with her father, mother was emotionally critical, close to her maternal extended family, close to cousin.   is most supportive relationship she’s ever had.  Losses – parental divorce, loss of childhood friend at age 9, loss of grandparent at age 19, grandmother last year from dementia. 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 Protected by responsibilities to family, support network, in therapy, reasons for living, future-oriented ,motivated for treatment .  Denies si/hi/ah/vh, future oriented, euthumic affect.\par DDX – anxiety, , r/o bipolar d/o\par PLAN – wants to transition care to [facility] treatment team\par Patient gives verbal consent to speak with patient’s . Nam Rodriguez [phone],  less than a year, known him for 7.5 years.\par Anxiety is much more manageable.  \par pt admitted to clinic - \par \par CLINIC COURSE:  [facility]  She began treatment with therapist.  She has been taking sertraline 50mg qhs with good effect since Aug 2022 and would like to continue it going forward.  RBA of medications discussed with patient. Describes anxiety as improving with brief, infrequent episodes of anxiety.  Denies SI/HI/AH/VH. No evidence of aftab or psychosis.  \par Had baby on 22, baby boy.  Jadiel.  Traumatic delivery, had to do emergent , which she wasn't prepared for.  Baby had to go NICU so delayed breast feeding.  Feeling emotionally unstable.  Crying often. Loves him, finds him to be really cute.  Baby sleeps ok, takes turns with . Denies SI/HI/AH/VH.  Denies thoughts of harming the baby.   feels that she gets anxious and cries often, but happy that they're doing a good job with the milk and taking care of the baby.  Continuing therapy with Rebeca.  Continues to take Zoloft 50mg daily, denies side effects.  States she just got a refill and doesn’t need rx today.Patient agreeable to increasing Zoloft to 75mg with plan to furhter increase if tolerated given hisotry of nausea.will f/u in 1 week.\par 23 - improving on on Zoloft 75mg.\par \par PLAN:\par - Supportive therapy provided\par - continue individual PMAD therapy 1-2x weekly\par - Continue Zoloft 75mg daily\par - Pt aware of emergency resources as needed
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
## 2023-01-06 DIAGNOSIS — Z79.84 LONG TERM (CURRENT) USE OF ORAL HYPOGLYCEMIC DRUGS: ICD-10-CM

## 2023-01-06 DIAGNOSIS — O61.0 FAILED MEDICAL INDUCTION OF LABOR: ICD-10-CM

## 2023-01-06 DIAGNOSIS — Z28.09 IMMUNIZATION NOT CARRIED OUT BECAUSE OF OTHER CONTRAINDICATION: ICD-10-CM

## 2023-01-06 DIAGNOSIS — R55 SYNCOPE AND COLLAPSE: ICD-10-CM

## 2023-01-06 DIAGNOSIS — F41.9 ANXIETY DISORDER, UNSPECIFIED: ICD-10-CM

## 2023-01-06 DIAGNOSIS — Z3A.38 38 WEEKS GESTATION OF PREGNANCY: ICD-10-CM

## 2023-01-06 DIAGNOSIS — N83.291 OTHER OVARIAN CYST, RIGHT SIDE: ICD-10-CM

## 2023-01-06 DIAGNOSIS — B00.9 HERPESVIRAL INFECTION, UNSPECIFIED: ICD-10-CM

## 2023-01-06 DIAGNOSIS — O34.83 MATERNAL CARE FOR OTHER ABNORMALITIES OF PELVIC ORGANS, THIRD TRIMESTER: ICD-10-CM

## 2023-01-06 DIAGNOSIS — F32.A DEPRESSION, UNSPECIFIED: ICD-10-CM

## 2023-01-06 NOTE — PLAN
[Integrative Therapy] : Integrative Therapy  [Psychodynamic Therapy] : Psychodynamic Therapy  [Recommended Frequency of Visits: ____] : Recommended frequency of visits: [unfilled] [Return in ____ week(s)] : Return in [unfilled] week(s) [FreeTextEntry2] : Goal A. Reduce symptoms of anxiety during pregnancy\par Objective A. Pt will utilize coping skills and strategies to more effectively manage anxiety. [de-identified] : Ms. Rodriguez arrived to session on time.  Ms. Rodriguez discussed her recent birth on Dec 22 as well as difficulties and trauma surrounding her birthing experience.  Writer assessed Ms. Rodriguez for symptoms of postpartum depression.  Ms. Rodriguez discussed changes in her appetite and sleep as well as feelings of passive SI immediately after birth.  Ms. Rodriguez denied any current feelings of SI.  Writer will work closely with Ms. Rodriguez to process her birthing experience as well as providing skills to address current PPD symptoms. Pt ended session in good behavioral and emotional control. [FreeTextEntry1] : Ms. Rodriguez will continue with weekly therapy. Writer will also complete mid week phone check in.  IAP to be reviewed 1/2023.

## 2023-01-06 NOTE — HISTORY OF PRESENT ILLNESS
[Complications:___] : complications include: [unfilled] [Delivery Date: ___] : on [unfilled] [Primary C/S] : delivered by  section [Male] : Delivery History: baby boy [NICU: ___] : NICU: [unfilled] [Breastfeeding] : currently nursing [BF with Difficulty] : nursing with difficulty [Abdominal Pain] : abdominal pain [BreastFeeding Problems] : breastfeeding problems [S/Sx PP Depression] : signs/symptoms of postpartum depression [Incisional Pain] : incisional pain [Fatigue] : fatigue [Clean/Dry/Intact] : clean, dry and intact [Resumed Menses] : has not resumed her menses [Resumed Charlo] : has not resumed intercourse [Heavy Bleeding] : no heavy bleeding [Incisional Drainage] : no incisional drainage [Irregular Bleeding] : no irregular bleeding [Leg Pain] : no leg pain [Shortness of Breath] : no shortness of breath [Suicidal Ideation] : no suicidal ideation [Vaginal Discharge] : no vaginal discharge [Erythema] : not erythematous [Swelling] : not swollen [Dehiscence] : not dehisced [FreeTextEntry8] : 2 week incision check  [de-identified] : S/p primary CS after failed IOL for gDMA2 [de-identified] : Ms. Rodriguez is now 2 weeks status post primary CS and presents for incision check.  [FreeTextEntry1] : Incision healing well. Activity and lifting precautions reviewed. Signs and symptoms of postpartum anxiety and depression reviewed. Patient endorses symptoms consistent with postpartum depression. She is engaging with her therapist frequently and her medications are being uptitrated as needed. Patient verbalized that her symptoms have improved this week with better sleep. Patient verbalized strong support system with her mom and . She is able to find radha with her baby. Return precautions given. All questions answered.\par

## 2023-01-07 LAB — SURGICAL PATHOLOGY STUDY: SIGNIFICANT CHANGE UP

## 2023-01-10 ENCOUNTER — APPOINTMENT (OUTPATIENT)
Dept: PSYCHIATRY | Facility: CLINIC | Age: 33
End: 2023-01-10
Payer: COMMERCIAL

## 2023-01-10 PROCEDURE — 99214 OFFICE O/P EST MOD 30 MIN: CPT | Mod: 95
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
## 2023-01-10 NOTE — RISK ASSESSMENT
[No, patient denies ideation or behavior] : No, patient denies ideation or behavior [Low acute suicide risk] : Low acute suicide risk [No] : No [FreeTextEntry9] : Low acute risk of harm to self/others at this time.\par \par  Low acute risk of self harm, despite anxiety.  Protected by responsibilities to family, support network, in therapy, reasons for living, future-oriented ,motivated for treatment .  Denies si/hi/ah/vh, future oriented, euthumic affect.
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
## 2023-01-10 NOTE — REASON FOR VISIT
[Telehealth (audio & video) - Individual/Group] : This visit was provided via telehealth using real-time 2-way audio visual technology. [Home] : at home, [unfilled] , at the time of the visit. [Other Location: e.g. Home (Enter Location, City,State)___] : at [unfilled] [Other:____] : [unfilled] [Patient] : the patient [FreeTextEntry1] : medication management

## 2023-01-13 NOTE — REASON FOR VISIT
[Patient preference] : as per patient preference [Telehealth (audio & video) - Individual/Group] : This visit was provided via telehealth using real-time 2-way audio visual technology. [Home] : The patient, [unfilled], was located at home, [unfilled], at the time of the visit. [Verbal consent obtained from patient/other participant(s)] : Verbal consent for telehealth/telephonic services obtained from patient/other participant(s) [Patient] : Patient [FreeTextEntry4] : 1:00 [FreeTextEntry5] : 1:45 [FreeTextEntry1] : Pt arrived for weekly psychotherapy appointment

## 2023-01-13 NOTE — PLAN
[Integrative Therapy] : Integrative Therapy  [Psychodynamic Therapy] : Psychodynamic Therapy  [Recommended Frequency of Visits: ____] : Recommended frequency of visits: [unfilled] [Return in ____ week(s)] : Return in [unfilled] week(s) [FreeTextEntry2] : Goal A. Reduce symptoms of anxiety and depression postpartum.\par Objective A. Pt will utilize coping skills and strategies to more effectively manage anxiety. [de-identified] : Ms. Rodriguez arrived to session on time.  Ms. Rodriguez discussed difficulties related to breastfeeding as well as feelings of guilt related to her inability to fully produce and exclusively breastfeed.  Ms. Rodriguez reported that some of her anxiety and depression symptoms are alleviating week to week.  Ms. Rodriguez also reported current PTSD symptoms (flashbacks, nightmares) related to birthing trauma.  Writer will work closely with Ms. Rodriguez to process her birthing experience as well as providing skills to address current PPD symptoms. Pt ended session in good behavioral and emotional control. [FreeTextEntry1] : Ms. Rodriguez will continue with weekly therapy. Writer will also complete mid week phone check in.  IAP to be reviewed 1/2023.

## 2023-01-17 ENCOUNTER — APPOINTMENT (OUTPATIENT)
Dept: PSYCHIATRY | Facility: CLINIC | Age: 33
End: 2023-01-17

## 2023-01-18 NOTE — PLAN
[Integrative Therapy] : Integrative Therapy  [Psychodynamic Therapy] : Psychodynamic Therapy  [Recommended Frequency of Visits: ____] : Recommended frequency of visits: [unfilled] [Return in ____ week(s)] : Return in [unfilled] week(s) [FreeTextEntry2] : Goal A. Reduce symptoms of anxiety and depression postpartum.\par Objective A. Pt will utilize coping skills and strategies to more effectively manage anxiety. [de-identified] : Ms. Rodriguez arrived to session on time.  Ms. Rodriguez discussed continued difficulties related to breastfeeding as well as feelings of guilt related to her inability to fully produce and exclusively breastfeed. Ms. Rodriguez noted current mood, appetite, and sleep are improving although not good.  Writer will work closely with Ms. Rodriguez to process her birthing experience as well as providing skills to address current PPD symptoms. Pt ended session in good behavioral and emotional control. [FreeTextEntry1] : Ms. Rodriguez will continue with weekly therapy. Writer will also complete mid week phone check in.  IAP to be reviewed 1/2023.

## 2023-01-23 ENCOUNTER — TRANSCRIPTION ENCOUNTER (OUTPATIENT)
Age: 33
End: 2023-01-23

## 2023-01-24 ENCOUNTER — APPOINTMENT (OUTPATIENT)
Dept: PSYCHIATRY | Facility: CLINIC | Age: 33
End: 2023-01-24
Payer: COMMERCIAL

## 2023-01-24 PROCEDURE — 99214 OFFICE O/P EST MOD 30 MIN: CPT | Mod: 95

## 2023-01-24 NOTE — REASON FOR VISIT
[Telehealth (audio & video) - Individual/Group] : This visit was provided via telehealth using real-time 2-way audio visual technology. [FreeTextEntry1] : medication management [Home] : at home, [unfilled] , at the time of the visit. [Other Location: e.g. Home (Enter Location, City,State)___] : at [unfilled] [Other:____] : [unfilled] [Patient] : the patient
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
## 2023-01-24 NOTE — DISCUSSION/SUMMARY
[FreeTextEntry1] : 32 year old cis-gendered, heterosexual Persian American female, lives with her  in [place], works as  at [employer], currently on leave 2’ being 23 wks pregnant, due 2023, PPH of depression, anxiety, ADHD, h/o cutting in high school, in therapy since age 5 on and off, numerous medication trials, specifically Zoloft and paxil for anxiety, restarted on Zoloft 50mg at beginning of pregnancy for anxiety, denies substance abuse, h/o smoking, \par Referred by OBGYN, presents for assessment and treatment of increased anxiety during pregnancy.\par Pt found out she was pregnant in 2022.  This is her first pregnancy, no children, no FH of  depression or anxiety, planned pregnancy, on ST disability secondary to morning sickness, happy to be growing a child. Plans to breastfeed.  Unsure if she will cont to work after pregnancy.  In-laws will help with childcare. Last gyn exam was 2 wks.  LMP 3/30, FMP age 14.  \par She describes feelings of apathy, amotivation, catastrophizing, frequent crying spells, excessive worries, ruminations, restlessness, and guilt about fears of negatively impacting her pregnancy.  She states anxiety comes daily but is not constant.  No panic attacks, last PA was 4 years ago.  Denies SI/HI/AH/VH.  Denies h/o manic episodes, denies hypersexuality, increased spending, decreased need for sleep, impulsive behaviors.  Presents with euthymic mood.   \par PPH – SIB (cutting) in high school, has written suicide letters and had pills ready when in high school.  Since then no attempts, preparatory acts or SIB.  Aggressive behaviors in middle school, states didn’t know how to show affection or love at that time.  No aggressive behaviors since then.  Therapy since age 5, from [age] longest, on Zoloft, then Paxil for anxiety.  From [age], -, numerous medication trials including Sapphris, Wellbutrin (weight gain), Klonopin, Xanax, Vyvanse, Adderall (weight loss), propranolol, Paxil, Zoloft.  Has been treated for anxiety, depression, ADHD ( based off of restlessness, low concentration, energy, and trouble sleeping) and bipolar dx (based off “slut phase at age 20 - One prior provider suggested she may have bipolar disorder in the past.  Patient denies s/s of prior manic episodes and states she doesn’t think she has it.) No meds since age 25.  Restarted therapy at beginning of pregnancy.  Denies past psychiatric hospitalizations.  \par SUBS – alcohol once a year, none during pregnancy, h/o smoking age [age], pack a day, not smoking now.\par MEDS- Zoloft 50mg daily since 2022 during pregnancy, prescribed by OBGYN.  No side effects.  \par ALL- soy\par FH – coiusin with anxiety/depression, no FH of suicide, biological father abused opiates.\par SH- lives with , college – BS in [employer] science, identifies as Holiness  American, raised in [place], no developmental delays, public school, As & Bs, no learning disabilities noted, parents  when she was 3years old, moved around a lot with various family members, stepfather involved in life, close to him, limited interactions with her biological father, childhood neglect when with her father, mother was emotionally critical, close to her maternal extended family, close to cousin.   is most supportive relationship she’s ever had.  Losses – parental divorce, loss of childhood friend at age 9, loss of grandparent at age 19, grandmother last year from dementia. 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 Protected by responsibilities to family, support network, in therapy, reasons for living, future-oriented ,motivated for treatment .  Denies si/hi/ah/vh, future oriented, euthumic affect.\par DDX – anxiety, , r/o bipolar d/o\par PLAN – wants to transition care to [facility] treatment team\par Patient gives verbal consent to speak with patient’s . Nam Rodriguez [phone],  less than a year, known him for 7.5 years.\par Anxiety is much more manageable.  \par pt admitted to clinic - \par \par CLINIC COURSE:  [facility]  She began treatment with therapist.  She has been taking sertraline 50mg qhs with good effect since Aug 2022 and would like to continue it going forward.  RBA of medications discussed with patient. Describes anxiety as improving with brief, infrequent episodes of anxiety.  Denies SI/HI/AH/VH. No evidence of aftab or psychosis.  \par Had baby on 22, baby boy.  Jadiel.  Traumatic delivery, had to do emergent , which she wasn't prepared for.  Baby had to go NICU so delayed breast feeding.  Feeling emotionally unstable.  Crying often. Loves him, finds him to be really cute.  Baby sleeps ok, takes turns with . Denies SI/HI/AH/VH.  Denies thoughts of harming the baby.   feels that she gets anxious and cries often, but happy that they're doing a good job with the milk and taking care of the baby.  Continuing therapy with Rebeca.  Continues to take Zoloft 50mg daily, denies side effects.  States she just got a refill and doesn’t need rx today.Patient agreeable to increasing Zoloft to 75mg with plan to furhter increase if tolerated given hisotry of nausea.will f/u in 1 week.  23 - improving on on Zoloft 75mg. 1/10/23 zoloft increased to 100mg to better address low mood.  Patient improving on zoloft 100mg d, no longer having thoughts of wanting to be dead.\par \par PLAN:\par - Supportive therapy provided\par - continue individual PMAD therapy 1-2x weekly\par - increase to Zoloft 100mg daily\par - Pt aware of emergency resources as needed

## 2023-01-24 NOTE — RISK ASSESSMENT
[No, patient denies ideation or behavior] : No, patient denies ideation or behavior [FreeTextEntry9] : Low acute risk of harm to self/others at this time.\par \par  Low acute risk of self harm, despite anxiety.  Protected by responsibilities to family, support network, in therapy, reasons for living, future-oriented ,motivated for treatment .  Denies si/hi/ah/vh, future oriented, euthumic affect. [Low acute suicide risk] : Low acute suicide risk [No] : No

## 2023-01-24 NOTE — HISTORY OF PRESENT ILLNESS
[FreeTextEntry1] : AT TIME OF INTAKE:\par Patient is a 32 year old, Cisgender, Heterosexual, [place]-American, Female born in [facility]. Pt grew up living with her mother and step-father primarily following the separation of her mother and father at 3 years old. Pt presents to treatment with sxs of heightened anxiety, ruminations, catastrophizing, frequent crying spells, and restlessness (despite long periods of sleeping). Pt is currently 5 months pregnant and reports that anxiety sxs have become exacerbated by the pregnancy. Pt reports a history of chronic anxiety, ADHD, and episodes of depression throughout her life. Pt was in therapy "on and off" since she was 5. Pt was in therapy from [age] years old (taking zoloft and then paxil). Went to therapy in middle school [age] years old. Went to therapy from [age] years old (took Saphris, Wellbutrin, Klonopin, Xanax, Vivance, Adderall, Propanalol), After college did not take medications. Has not been in therapy again until the beginning of her pregnancy (currently taking zoloft). No hx of psychological testing. 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 According to pt report, she does not endorse any aftab or manic symptoms in her history. Towards the end of pt's high school years pt endorses suicidality preparatory behaviors and self-injurious actions (cutting). Pt does not currently endorse suicidal ideation/plan/mean/intent. Pt reports infrequent alcohol use prior to pregnancy (1-2 a year) but reports tobacco use from college until age 28, smoking a pack of cigarettes a day. Pt does not currently endorse tobacco use. Pt has no hx of hospitalizations. Pt is currently being prescribed 50mg of zoloft.\par Pt reports that the pregnancy has been difficult due to severe morning sickness, she reports being unable to work currently due to the nausea she is experiencing.\par FH: Patient's reports that her father has substance abuse issues with opioids. Pt reports that her two cousins have depression and anxiety. Pt reports no other psychiatric hx in her family.\par \par \par PSYCHOSIS: N/A\par [[ ]] Paranoid ideation\par [[ ]] Ideas of reference\par [[ ]] Delusions of grandeur\par [[ ]] Thought broadcasting\par \par DISSOCIATIVE EXPERIENCES: N/A\par [[ ]] Derealization\par [[ ]] Depersonalization\par \par NEGATIVE SYMPTOMS: \par [[X ]] Apathy\par [[ ]] Anhedonia\par [[ ]] Lack of social interest\par [[X ]] Loss of motivation\par \par \par How many times have you been pregnant?  \par [ ]first pregnancy\par How many children do you have? (how many living children?)\par [ ] 0\par Does anyone in your family have a history of anxiety or depression during or after pregnancy?  \par [ ] No hx\par FOR CURRENTLY/RECENTLY PREGNANT WOMEN (WITH A BABY):\par Was this a planned pregnancy, unplanned, or something else?\par [ ] Planned pregnancy\par What changes have been made to the usual routine as a result of pregnancy (e.g. dietary changes, changes in activities or hobbies, changes to work)?\par [ ] Pt is on short term disability, is unable to work due to morning sickness. Less time for hobbies, activities. More time sleeping (10-14 hrs)\par What are/were the most pleasant aspects of pregnancy?\par [ ] The idea of 'growing a child', having a child afterwards. Buying things for the baby.\par What are/were the most unpleasant aspects of pregnancy?\par [ ] Morning sickness, the increased anxiety with the increased responsibility.\par What are the plans for feeding baby?\par [ ] Plans on breast feeding and using formula if she is unable to breastfeed.\par What are the plans for paid employment/work outside the home?\par [ ]  Pt is undecided, she will be taking a maternity leave but is considering being a stay at home.  \par Will any extended family be involved in childcare?\par [ ] Pt's in-laws will help to babysit. \par  [FreeTextEntry2] : PAST OUTPATIENT TREATMENT (PSYCHOTHERAPY, PSYCHOPHARMACOLOGY, PSYCHOLOGICAL TESTING):\par [ ]Pt was in therapy "on and off" since she was 5. Pt was in therapy from [age] years old (taking zoloft and then paxil). Went to therapy in middle school [age] years old. Went to therapy from [age] years old (took Saphris, Wellbutrin, Klonopin, Xanax, Vivance, Adderall, Propanalol), After college did not take medications. Has not been in therapy again until the beginning of her pregnancy (currently taking zoloft). No hx of psychological testing.\par PAST PSYCHIATRIC MEDICATIONS (NAMES, DOSES, DATES TAKEN, EFFECTIVENESS):\par [ ]  Does not remember doses of previous medications; currently taking 50 mg of Zoloft.\par PAST HOSPITALIZATIONS (DATES, REASON FOR ADMISSION, LENGTH OF STAY, TREATMENT, LAST PSYCHIATRIC TREATMENT):\par  [ ] No\par DATE OF LAST PHYSICAL EXAMINATION:\par [ ]No last physical exam, does not have a PCP\par .\par .\par BH MEDICATION SIDE EFFECTS: no side effects with current zoloft. Experienced weight gain with wellbutrin.\par  [No] : no [Yes > 3 months ago] : yes, > 3 months ago [Perceived burden on family or others] : perceived burden on family or others [History of abuse/trauma] : history of abuse/trauma [Other___] : [unfilled] [Responsibility to family or others] : responsibility to family or others [Identifies reasons for living] : identifies reasons for living [Future oriented] : future oriented [Supportive social network or family] : supportive social network or family [Positive therapeutic relationships] : positive therapeutic relationships [TextBox_52] : Pt was in high school and was engaging in cutting behaviors. Pt felt she did not have control of her life at the time. Preparatory behaviors included writing a letter and getting pills ready (end of high school, beginning of college). [Yes] : yes [None Known] : none known [Residential stability] : residential stability [Relationship stability] : relationship stability [Engagement in treatment] : engagement in treatment [Insight into violence risk and need for management/ treatment] : insight into violence risk and need for management/ treatment

## 2023-01-25 ENCOUNTER — TRANSCRIPTION ENCOUNTER (OUTPATIENT)
Age: 33
End: 2023-01-25

## 2023-01-26 NOTE — PLAN
[Integrative Therapy] : Integrative Therapy  [Psychodynamic Therapy] : Psychodynamic Therapy  [Recommended Frequency of Visits: ____] : Recommended frequency of visits: [unfilled] [Return in ____ week(s)] : Return in [unfilled] week(s) [FreeTextEntry2] : Goal A. Reduce symptoms of anxiety and depression postpartum.\par Objective A. Pt will utilize coping skills and strategies to more effectively manage anxiety. [de-identified] : Ms. Rodriguez arrived to session on time.  Ms. Rodriguez discussed some recent improvement with continuing her breastfeeding and milk production.  M Ms. Rodriguez noted current mood, appetite, and sleep are improving and much better (patient has returned interest in hobbies/downtime).  Ms. Rodriguez discussed adjusting to responsibilities around the house since her mother returned home to [place].  Writer will work closely with Ms. Rodriguez to process her birthing experience as well as providing skills to address current PPD symptoms. Pt ended session in good behavioral and emotional control. [FreeTextEntry1] : Ms. Rodriguez will continue with weekly therapy. Writer will also complete mid week phone check in.  IAP to be reviewed 1/2023.

## 2023-01-27 ENCOUNTER — APPOINTMENT (OUTPATIENT)
Dept: OBGYN | Facility: CLINIC | Age: 33
End: 2023-01-27
Payer: COMMERCIAL

## 2023-01-27 VITALS
OXYGEN SATURATION: 96 % | WEIGHT: 207.5 LBS | SYSTOLIC BLOOD PRESSURE: 107 MMHG | BODY MASS INDEX: 32.5 KG/M2 | DIASTOLIC BLOOD PRESSURE: 75 MMHG | HEART RATE: 79 BPM

## 2023-01-27 DIAGNOSIS — O99.340 OTHER MENTAL DISORDERS COMPLICATING PREGNANCY, UNSPECIFIED TRIMESTER: ICD-10-CM

## 2023-01-27 DIAGNOSIS — Z34.90 ENCOUNTER FOR SUPERVISION OF NORMAL PREGNANCY, UNSPECIFIED, UNSPECIFIED TRIMESTER: ICD-10-CM

## 2023-01-27 DIAGNOSIS — O21.9 VOMITING OF PREGNANCY, UNSPECIFIED: ICD-10-CM

## 2023-01-27 DIAGNOSIS — Z30.9 ENCOUNTER FOR CONTRACEPTIVE MANAGEMENT, UNSPECIFIED: ICD-10-CM

## 2023-01-27 DIAGNOSIS — F41.9 OTHER MENTAL DISORDERS COMPLICATING PREGNANCY, UNSPECIFIED TRIMESTER: ICD-10-CM

## 2023-01-27 DIAGNOSIS — O99.810 ABNORMAL GLUCOSE COMPLICATING PREGNANCY: ICD-10-CM

## 2023-01-27 PROCEDURE — 0503F POSTPARTUM CARE VISIT: CPT

## 2023-01-29 ENCOUNTER — NON-APPOINTMENT (OUTPATIENT)
Age: 33
End: 2023-01-29

## 2023-01-31 ENCOUNTER — APPOINTMENT (OUTPATIENT)
Dept: PSYCHIATRY | Facility: CLINIC | Age: 33
End: 2023-01-31

## 2023-01-31 LAB — HPV HIGH+LOW RISK DNA PNL CVX: NOT DETECTED

## 2023-02-02 ENCOUNTER — NON-APPOINTMENT (OUTPATIENT)
Age: 33
End: 2023-02-02

## 2023-02-02 PROBLEM — O21.9 NAUSEA AND VOMITING IN PREGNANCY: Status: RESOLVED | Noted: 2022-06-07 | Resolved: 2023-02-02

## 2023-02-02 PROBLEM — Z34.90 INTRAUTERINE PREGNANCY: Status: RESOLVED | Noted: 2022-05-16 | Resolved: 2023-02-02

## 2023-02-02 PROBLEM — Z30.9 CONTRACEPTION MANAGEMENT: Status: ACTIVE | Noted: 2023-02-02

## 2023-02-02 PROBLEM — O99.340 ANXIETY IN PREGNANCY, ANTEPARTUM: Status: RESOLVED | Noted: 2022-08-02 | Resolved: 2023-02-02

## 2023-02-02 PROBLEM — O99.810 GLUCOSE INTOLERANCE OF PREGNANCY: Status: RESOLVED | Noted: 2022-10-03 | Resolved: 2023-02-02

## 2023-02-02 NOTE — HISTORY OF PRESENT ILLNESS
[Postpartum Follow Up] : postpartum follow up [Delivery Date: ___] : on [unfilled] [Primary C/S] : delivered by  section [Male] : Delivery History: baby boy [Breastfeeding] : currently nursing [Wt. ___] : weighing [unfilled] [BF with Difficulty] : nursing with difficulty [S/Sx PP Depression] : signs/symptoms of postpartum depression [Clean/Dry/Intact] : clean, dry and intact [Erythema] : not erythematous [Swelling] : not swollen [Dehiscence] : not dehisced [Healed] : healed [Back to Normal] : is back to normal in size [None] : no vaginal bleeding [Normal] : the vagina was normal [Cervix Sample Taken] : cervical sample taken for a Pap smear [FreeTextEntry8] : Follow up six weeks post partum exam. [de-identified] : Ms. Rodriguez is meeting postoperative and postpartum milestones appropriately. She has symptoms consistent with postpartum depression that are managed with medication and counseling.  [FreeTextEntry1] : Patient cleared for intercourse and exercise. Contraception counseling performed - risks and benefits of different methods reviewed. Patient elects to use progesterone only pills. Patient's symptoms of postpartum anxiety and depression are improving. She has a strong support system at home and continues medication and counseling. Return precautions discussed. Patient verbalized understanding and that she has no additional questions.\par

## 2023-02-07 ENCOUNTER — APPOINTMENT (OUTPATIENT)
Dept: PSYCHIATRY | Facility: CLINIC | Age: 33
End: 2023-02-07

## 2023-02-07 ENCOUNTER — APPOINTMENT (OUTPATIENT)
Dept: PSYCHIATRY | Facility: CLINIC | Age: 33
End: 2023-02-07
Payer: COMMERCIAL

## 2023-02-07 PROCEDURE — 99214 OFFICE O/P EST MOD 30 MIN: CPT | Mod: 95

## 2023-02-07 NOTE — DISCUSSION/SUMMARY
[FreeTextEntry1] : 32 year old cis-gendered, heterosexual Frisian American female, lives with her  in [place], works as  at [employer], currently on leave 2’ being 23 wks pregnant, due 2023, PPH of depression, anxiety, ADHD, h/o cutting in high school, in therapy since age 5 on and off, numerous medication trials, specifically Zoloft and paxil for anxiety, restarted on Zoloft 50mg at beginning of pregnancy for anxiety, denies substance abuse, h/o smoking, \par Referred by OBGYN, presents for assessment and treatment of increased anxiety during pregnancy.\par Pt found out she was pregnant in 2022.  This is her first pregnancy, no children, no FH of  depression or anxiety, planned pregnancy, on ST disability secondary to morning sickness, happy to be growing a child. Plans to breastfeed.  Unsure if she will cont to work after pregnancy.  In-laws will help with childcare. Last gyn exam was 2 wks.  LMP 3/30, FMP age 14.  \par She describes feelings of apathy, amotivation, catastrophizing, frequent crying spells, excessive worries, ruminations, restlessness, and guilt about fears of negatively impacting her pregnancy.  She states anxiety comes daily but is not constant.  No panic attacks, last PA was 4 years ago.  Denies SI/HI/AH/VH.  Denies h/o manic episodes, denies hypersexuality, increased spending, decreased need for sleep, impulsive behaviors.  Presents with euthymic mood.   \par PPH – SIB (cutting) in high school, has written suicide letters and had pills ready when in high school.  Since then no attempts, preparatory acts or SIB.  Aggressive behaviors in middle school, states didn’t know how to show affection or love at that time.  No aggressive behaviors since then.  Therapy since age 5, from [age] longest, on Zoloft, then Paxil for anxiety.  From [age], -, numerous medication trials including Sapphris, Wellbutrin (weight gain), Klonopin, Xanax, Vyvanse, Adderall (weight loss), propranolol, Paxil, Zoloft.  Has been treated for anxiety, depression, ADHD ( based off of restlessness, low concentration, energy, and trouble sleeping) and bipolar dx (based off “slut phase at age 20 - One prior provider suggested she may have bipolar disorder in the past.  Patient denies s/s of prior manic episodes and states she doesn’t think she has it.) No meds since age 25.  Restarted therapy at beginning of pregnancy.  Denies past psychiatric hospitalizations.  \par SUBS – alcohol once a year, none during pregnancy, h/o smoking age [age], pack a day, not smoking now.\par MEDS- Zoloft 50mg daily since 2022 during pregnancy, prescribed by OBGYN.  No side effects.  \par ALL- soy\par FH – coiusin with anxiety/depression, no FH of suicide, biological father abused opiates.\par SH- lives with , college – BS in [employer] science, identifies as Scientologist  American, raised in [place], no developmental delays, public school, As & Bs, no learning disabilities noted, parents  when she was 3years old, moved around a lot with various family members, stepfather involved in life, close to him, limited interactions with her biological father, childhood neglect when with her father, mother was emotionally critical, close to her maternal extended family, close to cousin.   is most supportive relationship she’s ever had.  Losses – parental divorce, loss of childhood friend at age 9, loss of grandparent at age 19, grandmother last year from dementia. 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 Protected by responsibilities to family, support network, in therapy, reasons for living, future-oriented ,motivated for treatment .  Denies si/hi/ah/vh, future oriented, euthumic affect.\par DDX – anxiety, , r/o bipolar d/o\par PLAN – wants to transition care to [facility] treatment team\par Patient gives verbal consent to speak with patient’s . Nam Rodriguez [phone],  less than a year, known him for 7.5 years.\par Anxiety is much more manageable.  \par pt admitted to clinic - \par \par CLINIC COURSE:  [facility]  She began treatment with therapist.  She has been taking sertraline 50mg qhs with good effect since Aug 2022 and would like to continue it going forward.  RBA of medications discussed with patient. Describes anxiety as improving with brief, infrequent episodes of anxiety.  Denies SI/HI/AH/VH. No evidence of aftab or psychosis.  \par Had baby on 22, baby boy.  Jadiel.  Traumatic delivery, had to do emergent , which she wasn't prepared for.  Baby had to go NICU so delayed breast feeding.  Feeling emotionally unstable.  Crying often. Loves him, finds him to be really cute.  Baby sleeps ok, takes turns with . Denies SI/HI/AH/VH.  Denies thoughts of harming the baby.   feels that she gets anxious and cries often, but happy that they're doing a good job with the milk and taking care of the baby.  Continuing therapy with Rebeca.  Continues to take Zoloft 50mg daily, denies side effects.  States she just got a refill and doesn’t need rx today.Patient agreeable to increasing Zoloft to 75mg with plan to furhter increase if tolerated given hisotry of nausea.will f/u in 1 week.  23 - improving on on Zoloft 75mg. 1/10/23 zoloft increased to 100mg to better address low mood.  Patient improving on zoloft 100mg d, no longer having thoughts of wanting to be dead. 23 - stable on Zoloft 100mg daily, doing well.  Denies SI/HI/AH/VH.  No evidence of psycohsis or aftab.\par \par PLAN:\par - Supportive therapy provided\par - continue individual PMAD therapy 1-2x weekly\par - increase to Zoloft 100mg daily\par - Pt aware of emergency resources as needed
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
## 2023-02-07 NOTE — PLAN
[No] : No [Medication education provided] : Medication education provided. [Rationale for medication choices, possible risks/precautions, benefits, alternative treatment choices, and consequences of non-treatment discussed] : Rationale for medication choices, possible risks/precautions, benefits, alternative treatment choices, and consequences of non-treatment discussed with patient/family/caregiver  [FreeTextEntry4] : to address anxiety related to pregnancy [FreeTextEntry5] : see clinical summary
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
## 2023-02-14 ENCOUNTER — APPOINTMENT (OUTPATIENT)
Dept: PSYCHIATRY | Facility: CLINIC | Age: 33
End: 2023-02-14

## 2023-02-14 NOTE — PLAN
[Integrative Therapy] : Integrative Therapy  [Psychodynamic Therapy] : Psychodynamic Therapy  [Recommended Frequency of Visits: ____] : Recommended frequency of visits: [unfilled] [Return in ____ week(s)] : Return in [unfilled] week(s) [FreeTextEntry2] : Goal A. Reduce symptoms of anxiety and depression postpartum.\par Objective A. Pt will utilize coping skills and strategies to more effectively manage anxiety. [de-identified] : Ms. Rodriguez arrived to session on time.  Ms. Rodriguez discussed her  returning to work after family leave and adjusting to childcare alone during the day.  Ms. Rodriguez noted current mood, appetite, and sleep are improving and much better (patient has returned interest in hobbies/downtime).  Writer will work closely with Ms. Rodriguez to process her birthing experience as well as providing skills to address current PPD symptoms. Pt ended session in good behavioral and emotional control. [FreeTextEntry1] : Ms. Rodriguez will continue with weekly therapy. Writer will also complete mid week phone check in.  IAP to be reviewed 1/2023.

## 2023-02-15 NOTE — PLAN
[Integrative Therapy] : Integrative Therapy  [Psychodynamic Therapy] : Psychodynamic Therapy  [Recommended Frequency of Visits: ____] : Recommended frequency of visits: [unfilled] [Return in ____ week(s)] : Return in [unfilled] week(s) [FreeTextEntry2] : Goal A. Reduce symptoms of anxiety and depression postpartum.\par Objective A. Pt will utilize coping skills and strategies to more effectively manage anxiety. [de-identified] : Ms. Rodriguez arrived to session on time.  Ms. Rodriguez discussed continued adjustment to her  returning to work while being responsible for childcare alone during the day.  Ms. Rodriguez noted current mood, appetite, and sleep are good while  dependent on baby's sleep schedule and fussiness.  Writer will work closely with Ms. Rodriguez to process her birthing experience as well as providing skills to address current PPD symptoms. Pt ended session in good behavioral and emotional control. [FreeTextEntry1] : Ms. Rodriguez will continue with weekly therapy. Writer will also complete mid week phone check in.  IAP to be reviewed 1/2023.

## 2023-02-21 ENCOUNTER — APPOINTMENT (OUTPATIENT)
Dept: PSYCHIATRY | Facility: CLINIC | Age: 33
End: 2023-02-21
Payer: COMMERCIAL

## 2023-02-21 PROCEDURE — 99214 OFFICE O/P EST MOD 30 MIN: CPT | Mod: 95
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
## 2023-02-21 NOTE — DISCUSSION/SUMMARY
[FreeTextEntry1] : 32 year old cis-gendered, heterosexual [place] American female, lives with her  in [place], works as  at [employer], currently on leave 2’ being 23 wks pregnant, due 2023, PPH of depression, anxiety, ADHD, h/o cutting in high school, in therapy since age 5 on and off, numerous medication trials, specifically Zoloft and paxil for anxiety, restarted on Zoloft 50mg at beginning of pregnancy for anxiety, denies substance abuse, h/o smoking, \par Referred by OBGYN, presents for assessment and treatment of increased anxiety during pregnancy.\par Pt found out she was pregnant in 2022.  This is her first pregnancy, no children, no FH of  depression or anxiety, planned pregnancy, on ST disability secondary to morning sickness, happy to be growing a child. Plans to breastfeed.  Unsure if she will cont to work after pregnancy.  In-laws will help with childcare. Last gyn exam was 2 wks.  LMP 3/30, FMP age 14.  \par She describes feelings of apathy, amotivation, catastrophizing, frequent crying spells, excessive worries, ruminations, restlessness, and guilt about fears of negatively impacting her pregnancy.  She states anxiety comes daily but is not constant.  No panic attacks, last PA was 4 years ago.  Denies SI/HI/AH/VH.  Denies h/o manic episodes, denies hypersexuality, increased spending, decreased need for sleep, impulsive behaviors.  Presents with euthymic mood.   \par PPH – SIB (cutting) in high school, has written suicide letters and had pills ready when in high school.  Since then no attempts, preparatory acts or SIB.  Aggressive behaviors in middle school, states didn’t know how to show affection or love at that time.  No aggressive behaviors since then.  Therapy since age 5, from [age] longest, on Zoloft, then Paxil for anxiety.  From [age], -, numerous medication trials including Sapphris, Wellbutrin (weight gain), Klonopin, Xanax, Vyvanse, Adderall (weight loss), propranolol, Paxil, Zoloft.  Has been treated for anxiety, depression, ADHD ( based off of restlessness, low concentration, energy, and trouble sleeping) and bipolar dx (based off “slut phase at age 20 - One prior provider suggested she may have bipolar disorder in the past.  Patient denies s/s of prior manic episodes and states she doesn’t think she has it.) No meds since age 25.  Restarted therapy at beginning of pregnancy.  Denies past psychiatric hospitalizations.  \par SUBS – alcohol once a year, none during pregnancy, h/o smoking age [age], pack a day, not smoking now.\par MEDS- Zoloft 50mg daily since 2022 during pregnancy, prescribed by OBGYN.  No side effects.  \par ALL- soy\par FH – coiusin with anxiety/depression, no FH of suicide, biological father abused opiates.\par SH- lives with , college – BS in [employer] science, identifies as Congregational  American, raised in [place], no developmental delays, public school, As & Bs, no learning disabilities noted, parents  when she was 3years old, moved around a lot with various family members, stepfather involved in life, close to him, limited interactions with her biological father, childhood neglect when with her father, mother was emotionally critical, close to her maternal extended family, close to cousin.   is most supportive relationship she’s ever had.  Losses – parental divorce, loss of childhood friend at age 9, loss of grandparent at age 19, grandmother last year from dementia. 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 Protected by responsibilities to family, support network, in therapy, reasons for living, future-oriented ,motivated for treatment .  Denies si/hi/ah/vh, future oriented, euthumic affect.\par DDX – anxiety, , r/o bipolar d/o\par PLAN – wants to transition care to [facility] treatment team\par Patient gives verbal consent to speak with patient’s . Nam Rodriguez [phone],  less than a year, known him for 7.5 years.\par Anxiety is much more manageable.  \par pt admitted to clinic - \par \par CLINIC COURSE:  [facility]  She began treatment with therapist.  She has been taking sertraline 50mg qhs with good effect since Aug 2022 and would like to continue it going forward.  RBA of medications discussed with patient. Describes anxiety as improving with brief, infrequent episodes of anxiety.  Denies SI/HI/AH/VH. No evidence of aftab or psychosis.  \par Had baby on 22, baby boy.  Jadiel.  Traumatic delivery, had to do emergent , which she wasn't prepared for.  Baby had to go NICU so delayed breast feeding.  Feeling emotionally unstable.  Crying often. Loves him, finds him to be really cute.  Baby sleeps ok, takes turns with . Denies SI/HI/AH/VH.  Denies thoughts of harming the baby.   feels that she gets anxious and cries often, but happy that they're doing a good job with the milk and taking care of the baby.  Continuing therapy with Rebeca.  Continues to take Zoloft 50mg daily, denies side effects.  States she just got a refill and doesn’t need rx today.Patient agreeable to increasing Zoloft to 75mg with plan to furhter increase if tolerated given hisotry of nausea.will f/u in 1 week.  23 - improving on on Zoloft 75mg. 1/10/23 zoloft increased to 100mg to better address low mood.  Patient improving on zoloft 100mg d, no longer having thoughts of wanting to be dead. 23 - stable on Zoloft 100mg daily, doing well.  Denies SI/HI/AH/VH.  No evidence of psycohsis or aftab.  - stable, doing well, denies si/hi/ah/vh, no thoughts of harming baby, euthymic affect.\par \par PLAN:\par - Supportive therapy provided\par - continue individual PMAD therapy 1-2x weekly\par - increase to Zoloft 100mg daily\par - Pt aware of emergency resources as needed

## 2023-02-24 NOTE — PLAN
[Integrative Therapy] : Integrative Therapy  [Psychodynamic Therapy] : Psychodynamic Therapy  [Recommended Frequency of Visits: ____] : Recommended frequency of visits: [unfilled] [Return in ____ week(s)] : Return in [unfilled] week(s) [FreeTextEntry2] : Goal A. Reduce symptoms of anxiety and depression postpartum.\par Objective A. Pt will utilize coping skills and strategies to more effectively manage anxiety. [de-identified] : Ms. Rodriguez arrived to session on time.  Ms. Rodriguez discussed continued adjustment as well as feelings of frustration and overwhelm to her  returning to work while being responsible for childcare alone during the day.  Ms. Rodriguez noted current mood, appetite, and sleep are good.  Ms. Rodriguez still noted flashbacks to birth trauma and occasional dissociation.  Writer will work closely with Ms. Rodriguez to process her birthing experience as well as providing skills to address current PPD symptoms. Pt ended session in good behavioral and emotional control. [FreeTextEntry1] : Ms. Rodriguez will continue with weekly therapy. Writer will also complete mid week phone check in.  IAP to be reviewed 1/2023.

## 2023-02-24 NOTE — PLAN
[Integrative Therapy] : Integrative Therapy  [Psychodynamic Therapy] : Psychodynamic Therapy  [Recommended Frequency of Visits: ____] : Recommended frequency of visits: [unfilled] [Return in ____ week(s)] : Return in [unfilled] week(s) [FreeTextEntry2] : Goal A. Reduce symptoms of anxiety and depression postpartum.\par Objective A. Pt will utilize coping skills and strategies to more effectively manage anxiety. [de-identified] : Ms. Rodriguez arrived to session on time.  Ms. Rodriguez discussed continued adjustment as well as feelings of frustration and overwhelm to her  returning to work while being responsible for childcare alone during the day.  Ms. Rodriguez noted current mood, appetite, and sleep are good.  Ms. Rodriguez still noted flashbacks to birth trauma and occasional dissociation.  Writer will work closely with Ms. Rodriguez to process her birthing experience as well as providing skills to address current PPD symptoms. Pt ended session in good behavioral and emotional control. [FreeTextEntry1] : Ms. Rodriguez will continue with weekly therapy. Writer will also complete mid week phone check in.  IAP to be reviewed 1/2023.

## 2023-02-24 NOTE — PLAN
[Integrative Therapy] : Integrative Therapy  [Psychodynamic Therapy] : Psychodynamic Therapy  [Recommended Frequency of Visits: ____] : Recommended frequency of visits: [unfilled] [Return in ____ week(s)] : Return in [unfilled] week(s) [FreeTextEntry2] : Goal A. Reduce symptoms of anxiety and depression postpartum.\par Objective A. Pt will utilize coping skills and strategies to more effectively manage anxiety. [de-identified] : Ms. Rodriguez arrived to session on time.  Ms. Rodriguez discussed continued adjustment as well as feelings of frustration and overwhelm to her  returning to work while being responsible for childcare alone during the day.  Ms. Rodrgiuez noted current mood, appetite, and sleep are good.  Ms. Rodriguez still noted flashbacks to birth trauma and occasional dissociation.  Writer will work closely with Ms. Rodriguez to process her birthing experience as well as providing skills to address current PPD symptoms. Pt ended session in good behavioral and emotional control. [FreeTextEntry1] : Ms. Rodriguez will continue with weekly therapy. Writer will also complete mid week phone check in.  IAP to be reviewed 1/2023.

## 2023-02-24 NOTE — REASON FOR VISIT
Problem: Mobility Impaired (Adult and Pediatric)  Goal: *Acute Goals and Plan of Care (Insert Text)  Description: Physical Therapy Goals  Initiated 2/9/2022 and to be accomplished within 7 day(s)  1. Patient will move from supine to sit and sit to supine  in bed with modified independence. 2.  Patient will transfer from bed to chair and chair to bed with modified independence using the least restrictive device. 3.  Patient will perform sit to stand with modified independence. 4.  Patient will ambulate with modified independence for 50 feet with the least restrictive device. 5.  Patient will ascend/descend 3 stairs with B handrail(s) with modified independence. PLOF: Pt lives with his spouse in a h with 3 CATHIE. Wade with a RW PTA. Outcome: Progressing Towards Goal   PHYSICAL THERAPY TREATMENT    Patient: Julia Valladares (24 y.o. male)  Date: 2/11/2022  Diagnosis: Peripheral arterial disease ([facility]) [I73.9]  Ischemic leg [I99.8]  S/P BKA (below knee amputation) ([facility]) [Z89.519]   Procedure(s) (LRB):  RIGHT LEG BELOW KNEE AMPUTATION (BKA) (Right) 3 Days Post-Op  Precautions: Fall,Skin (R BKA)  ASSESSMENT:  Found with pillow under RLE; educated about need for knee and hip extension. No pillows to be placed under right residual limb. Supervision for supine to sit. Supervision for sit to stand and stand pivot to chair. Poor safety awareness. Declines use of ww. Seated in recliner. Educated on knee extension ROM for RLE. Request pillow to be placed under RLE; no carryover from prior education. Reinforced importance of knee extension and no pillows. Right knee with 20-30 degree contracture. Poor tolerance of manual therapy to increase passive knee extension. Educated on quad sets and knee extensions. Demonstrates understanding within available range. Remained seated in chair with BLE elevated. Educated on need for RN assistance with mobility; verbalized understanding. Call bell in reach.      Progression toward goals:   []      Improving appropriately and progressing toward goals  [x]      Improving slowly and progressing toward goals  []      Not making progress toward goals and plan of care will be adjusted     PLAN:  Patient continues to benefit from skilled intervention to address the above impairments. Continue treatment per established plan of care. Discharge Recommendations:  Rehab  Further Equipment Recommendations for Discharge:  rolling walker and wheelchair      SUBJECTIVE:   Patient stated I need some more pain medicine now.  (nurse administered at start of session)    OBJECTIVE DATA SUMMARY:   Critical Behavior:  Neurologic State: Alert  Orientation Level: Oriented X4  Cognition: Poor safety awareness     Psychosocial  Patient Behaviors: Calm  Functional Mobility:  Bed Mobility:  Supine to Sit: Supervision  Scooting: Supervision  Transfers:  Sit to Stand: Supervision  Stand to Sit: Supervision  Bed to Chair: Supervision  Balance:   Sitting: Intact  Standing: Impaired  Standing - Static: Good  Standing - Dynamic : Good  Therapeutic Exercises:   RLE knee extension, quad sets    Pain:  Pain level pre-treatment: 9/10  Pain level post-treatment: 9/10     Activity Tolerance:   Fair    After treatment:   [x] Patient left in no apparent distress sitting up in chair  [] Patient left in no apparent distress in bed  [x] Call bell left within reach  [x] Nursing notified  [] Caregiver present  [] Bed alarm activated  [] SCDs applied      COMMUNICATION/EDUCATION:   [x]         Role of physical therapy in the acute care setting. [x]         Fall prevention education was provided and the patient/caregiver indicated understanding. [x]         Patient/family have participated as able in working toward goals and plan of care. [x]         Patient/family agree to work toward stated goals and plan of care. []         Patient understands intent and goals of therapy, but is neutral about his/her participation.   [] Patient is unable to participate in stated goals/plan of care: ongoing with therapy staff.       Will Carolann, PT   Time Calculation: 9 mins [Patient preference] : as per patient preference [Telehealth (audio & video) - Individual/Group] : This visit was provided via telehealth using real-time 2-way audio visual technology. [Home] : The patient, [unfilled], was located at home, [unfilled], at the time of the visit. [Verbal consent obtained from patient/other participant(s)] : Verbal consent for telehealth/telephonic services obtained from patient/other participant(s) [Patient] : Patient [FreeTextEntry4] : 1:00 [FreeTextEntry5] : 1:45 [FreeTextEntry1] : Pt arrived for weekly psychotherapy appointment

## 2023-02-28 ENCOUNTER — APPOINTMENT (OUTPATIENT)
Dept: PSYCHIATRY | Facility: CLINIC | Age: 33
End: 2023-02-28

## 2023-03-01 NOTE — RISK ASSESSMENT
[FreeTextEntry7] : Pt does not report any safety issues or risk. Pt denies SI/HI/AH/VH.  [Yes, patient reports ideation or behavior] : Yes, patient reports ideation or behavior [No, patient denies ideation or behavior] : No, patient denies ideation or behavior [Yes] : Yes [No] : No

## 2023-03-07 ENCOUNTER — APPOINTMENT (OUTPATIENT)
Dept: PSYCHIATRY | Facility: CLINIC | Age: 33
End: 2023-03-07

## 2023-03-07 NOTE — DISCUSSION/SUMMARY
[Initial Plan] : Initial Plan [Able to exercise self-direction] : able to exercise self-direction [Articulate] : articulate [Cognitively intact] : cognitively intact [Intelligent] : intelligent [Motivated to participate in treatment] : motivated to participate in treatment [Motivated and ready for change] : motivated and ready for change [Part of a supportive marriage] : part of a supportive marriage [Part of a supportive family] : part of a supportive family [Housing stability] : housing stability [Social supports] : social supports [Mental Health] : Mental Health [Continued - Progress made] : Continued - Progress made: [every ___ weeks] : every [unfilled] weeks [___ times a week] : [unfilled] times a week [Improvement in symptoms as evidenced by:] : Improvement in symptoms as evidenced by: [Attainment of higher level of functioning as evidenced by:] : Attainment of higher level of functioning as evidenced by: [None - Reason others did not participate:] : None - Reason others did not participate:  [Psychiatric Provider/Prescriber] : Psychiatric Provider/Prescriber [Denied] : Denied [Yes] : Yes [Yes: Details:___] : Yes: [unfilled] [No] : No [Advised to schedule] : Advised to schedule [Not indicated at this time] : Not indicated at this time [Not clinically indicated] : Not clinically indicated [No, advised to schedule] : No, advised to schedule [Does patient use tobacco products?] : Patient does not use tobacco products [Does patient use medical marijuana?] : Patient does not use medical marijuana. [Patient has been tested for HIV?] : Patient has been tested for HIV [Patient has been tested for Hepatitis?] : Patient has been tested for hepatitis [Patient would like to be tested/re-tested?] : patient would not like to be tested/re-tested [Negative] : Negative [Unknown] : Unknown [Current or past COVID-19 diagnosis?] : Patient does not have a current or past COVID-19 diagnosis [Vaccinated?] : is vaccinated [Date of last vaccination (mm/dd/yy):___] : date of last vaccination: [unfilled] [Education provided about COVID-19?] : Education provided about COVID-19 [FreeTextEntry2] : 9/6/2023 [FreeTextEntry1] : increased symptoms of postpartum depression  [FreeTextEntry4] : building awareness and increased coping skills related to adjustment to life postpartum.  [de-identified] : build support and coping skills for adjusting to motherhood [de-identified] : 8/1/23 [de-identified] : patient has increased emotional awareness and strategies to navigate adjustment [de-identified] : Process birth trauma as well as reduce PTSD symptoms. [de-identified] : 8/1/23 [de-identified] : Patient continues to address and process birthing experience as well as building coping skills surrounding PTSD symptoms. [FreeTextEntry5] : weekly psychodynamic/supportive therapy \par Medication management [de-identified] : ability to enjoy hobbies/activites [de-identified] : Increased energy and motivation

## 2023-03-07 NOTE — DISCUSSION/SUMMARY
[Initial Plan] : Initial Plan [Able to exercise self-direction] : able to exercise self-direction [Articulate] : articulate [Cognitively intact] : cognitively intact [Intelligent] : intelligent [Motivated to participate in treatment] : motivated to participate in treatment [Motivated and ready for change] : motivated and ready for change [Part of a supportive marriage] : part of a supportive marriage [Part of a supportive family] : part of a supportive family [Housing stability] : housing stability [Social supports] : social supports [Mental Health] : Mental Health [Continued - Progress made] : Continued - Progress made: [every ___ weeks] : every [unfilled] weeks [___ times a week] : [unfilled] times a week [Improvement in symptoms as evidenced by:] : Improvement in symptoms as evidenced by: [Attainment of higher level of functioning as evidenced by:] : Attainment of higher level of functioning as evidenced by: [None - Reason others did not participate:] : None - Reason others did not participate:  [Psychiatric Provider/Prescriber] : Psychiatric Provider/Prescriber [Denied] : Denied [Yes] : Yes [Yes: Details:___] : Yes: [unfilled] [No] : No [Advised to schedule] : Advised to schedule [Not indicated at this time] : Not indicated at this time [Not clinically indicated] : Not clinically indicated [No, advised to schedule] : No, advised to schedule [Does patient use tobacco products?] : Patient does not use tobacco products [Does patient use medical marijuana?] : Patient does not use medical marijuana. [Patient has been tested for HIV?] : Patient has been tested for HIV [Patient has been tested for Hepatitis?] : Patient has been tested for hepatitis [Patient would like to be tested/re-tested?] : patient would not like to be tested/re-tested [Negative] : Negative [Unknown] : Unknown [Current or past COVID-19 diagnosis?] : Patient does not have a current or past COVID-19 diagnosis [Vaccinated?] : is vaccinated [Date of last vaccination (mm/dd/yy):___] : date of last vaccination: [unfilled] [Education provided about COVID-19?] : Education provided about COVID-19 [FreeTextEntry2] : 9/6/2023 [FreeTextEntry1] : increased symptoms of postpartum depression  [FreeTextEntry4] : building awareness and increased coping skills related to adjustment to life postpartum.  [de-identified] : build support and coping skills for adjusting to motherhood [de-identified] : 8/1/23 [de-identified] : patient has increased emotional awareness and strategies to navigate adjustment [de-identified] : Process birth trauma as well as reduce PTSD symptoms. [de-identified] : 8/1/23 [de-identified] : Patient continues to address and process birthing experience as well as building coping skills surrounding PTSD symptoms. [FreeTextEntry5] : weekly psychodynamic/supportive therapy \par Medication management [de-identified] : Increased energy and motivation [de-identified] : ability to enjoy hobbies/activites

## 2023-03-07 NOTE — PHYSICAL EXAM
[Cooperative] : cooperative [Euthymic] : euthymic [Full] : full [Clear] : clear [Linear/Goal Directed] : linear/goal directed [Average] : average [WNL] : within normal limits [0 - Never] : 0 - Never [0 - No] : 0 - No [5] : 5 [2] : 2 [4] : 4 [3] : 3 [] : No [FreeTextEntry1] : 0 [SchwartzScore] : 38

## 2023-03-07 NOTE — DISCUSSION/SUMMARY
[Initial Plan] : Initial Plan [Able to exercise self-direction] : able to exercise self-direction [Articulate] : articulate [Cognitively intact] : cognitively intact [Intelligent] : intelligent [Motivated to participate in treatment] : motivated to participate in treatment [Motivated and ready for change] : motivated and ready for change [Part of a supportive marriage] : part of a supportive marriage [Part of a supportive family] : part of a supportive family [Housing stability] : housing stability [Social supports] : social supports [Mental Health] : Mental Health [Continued - Progress made] : Continued - Progress made: [every ___ weeks] : every [unfilled] weeks [___ times a week] : [unfilled] times a week [Improvement in symptoms as evidenced by:] : Improvement in symptoms as evidenced by: [Attainment of higher level of functioning as evidenced by:] : Attainment of higher level of functioning as evidenced by: [None - Reason others did not participate:] : None - Reason others did not participate:  [Psychiatric Provider/Prescriber] : Psychiatric Provider/Prescriber [Denied] : Denied [Yes] : Yes [Yes: Details:___] : Yes: [unfilled] [No] : No [Advised to schedule] : Advised to schedule [Not indicated at this time] : Not indicated at this time [Not clinically indicated] : Not clinically indicated [No, advised to schedule] : No, advised to schedule [Does patient use tobacco products?] : Patient does not use tobacco products [Does patient use medical marijuana?] : Patient does not use medical marijuana. [Patient has been tested for HIV?] : Patient has been tested for HIV [Patient has been tested for Hepatitis?] : Patient has been tested for hepatitis [Patient would like to be tested/re-tested?] : patient would not like to be tested/re-tested [Negative] : Negative [Unknown] : Unknown [Current or past COVID-19 diagnosis?] : Patient does not have a current or past COVID-19 diagnosis [Vaccinated?] : is vaccinated [Date of last vaccination (mm/dd/yy):___] : date of last vaccination: [unfilled] [Education provided about COVID-19?] : Education provided about COVID-19 [FreeTextEntry2] : 9/6/2023 [FreeTextEntry1] : increased symptoms of postpartum depression  [FreeTextEntry4] : building awareness and increased coping skills related to adjustment to life postpartum.  [de-identified] : build support and coping skills for adjusting to motherhood [de-identified] : patient has increased emotional awareness and strategies to navigate adjustment [de-identified] : 8/1/23 [de-identified] : Process birth trauma as well as reduce PTSD symptoms. [de-identified] : 8/1/23 [de-identified] : Patient continues to address and process birthing experience as well as building coping skills surrounding PTSD symptoms. [FreeTextEntry5] : weekly psychodynamic/supportive therapy \par Medication management [de-identified] : Increased energy and motivation [de-identified] : ability to enjoy hobbies/activites

## 2023-03-09 NOTE — ADDENDUM
[FreeTextEntry1] : .\par .\par Case discussed in clinical supervision with Dr. Lorena Quintana, PhD [[ X]] individual [[ ]] group (Check one)\par ASSESSMENT METHOD (Check all that apply): \par [[ X]] Case presentation \par [[ ]] Review of Record/Data \par [[ ]] Direct Observation \par [[ ]] Audio Recording \par [[ ]] Video Recording \par FOCUS OF SUPERVISION (Check all that apply): \par [[ ]] Diagnosis & Assessment \par [[X ]] Intervention \par [[ ]] Professional Conduct \par [[ ]] Culture and Diversity \par [[ ]] Scholarly Inquiry   \par [[ ]] Consultation \par [[ ]] Supervision \par [[ ]] Administration/Documentation\par \par 
no

## 2023-03-09 NOTE — PLAN
[Integrative Therapy] : Integrative Therapy  [Psychodynamic Therapy] : Psychodynamic Therapy  [Recommended Frequency of Visits: ____] : Recommended frequency of visits: [unfilled] [Return in ____ week(s)] : Return in [unfilled] week(s) [FreeTextEntry2] : Goal A. Reduce symptoms of anxiety and depression postpartum.\par Objective A. Pt will utilize coping skills and strategies to more effectively manage anxiety. [de-identified] : Ms. Rodriguez arrived to session on time.  Ms. Rodriguez discussed recent sickness with her infant son and worry related to taking care of him.  Ms. Rodriguez noted current mood, appetite, and sleep are good.  Ms. Rodriguez still noted  occasional dissociation related to postpartum experience with her son.  Writer will work closely with Ms. Rodriguez to process her birthing experience as well as providing skills to address current PPD symptoms. Pt ended session in good behavioral and emotional control. [FreeTextEntry1] : Ms. Rodriguez will continue with weekly therapy. Writer will also complete mid week phone check in.  IAP to be reviewed 1/2023.

## 2023-03-14 ENCOUNTER — APPOINTMENT (OUTPATIENT)
Dept: PSYCHIATRY | Facility: CLINIC | Age: 33
End: 2023-03-14

## 2023-03-21 ENCOUNTER — APPOINTMENT (OUTPATIENT)
Dept: PSYCHIATRY | Facility: CLINIC | Age: 33
End: 2023-03-21

## 2023-03-23 NOTE — PLAN
[Integrative Therapy] : Integrative Therapy  [Psychodynamic Therapy] : Psychodynamic Therapy  [Recommended Frequency of Visits: ____] : Recommended frequency of visits: [unfilled] [Return in ____ week(s)] : Return in [unfilled] week(s) [FreeTextEntry2] : Goal A. Reduce symptoms of anxiety and depression postpartum.\par Objective A. Pt will utilize coping skills and strategies to more effectively manage anxiety. [de-identified] : Ms. Rodriguez arrived to session on time.  Ms. Rodriguez discussed feeling under the weather over the last week and struggling to take care of day to day tasks. Ms. Rodriguez discussed conflicting feelings related to her in-laws helpfulness during this time. Ms. Rodriguez noted current mood, appetite, and sleep are ok.  Ms. Rodriguez still noted  occasional dissociation related to postpartum experience with her son.  Writer will work closely with Ms. Rodriguez to process her birthing experience as well as providing skills to address current PPD symptoms. Pt ended session in good behavioral and emotional control. [FreeTextEntry1] : Ms. Rodriguez will continue with weekly therapy. Writer will also complete mid week phone check in.  IAP to be reviewed 1/2023.

## 2023-03-28 ENCOUNTER — APPOINTMENT (OUTPATIENT)
Dept: PSYCHIATRY | Facility: CLINIC | Age: 33
End: 2023-03-28

## 2023-04-04 ENCOUNTER — APPOINTMENT (OUTPATIENT)
Dept: PSYCHIATRY | Facility: CLINIC | Age: 33
End: 2023-04-04

## 2023-04-07 NOTE — PLAN
[Integrative Therapy] : Integrative Therapy  [Psychodynamic Therapy] : Psychodynamic Therapy  [Recommended Frequency of Visits: ____] : Recommended frequency of visits: [unfilled] [Return in ____ week(s)] : Return in [unfilled] week(s) [FreeTextEntry2] : Goal A. Reduce symptoms of anxiety and depression postpartum.\par Objective A. Pt will utilize coping skills and strategies to more effectively manage anxiety. [de-identified] : Ms. Rodriguez arrived to session on time.  Ms. Rodriguez discussed continued adjustments to motherhood while struggling to feel assured and confident in her role as mom.  Ms. Rodriguez noted a number of medical concerns for her son as well as her responsibility and role for his conditions. Ms. Rodriguez noted current mood, appetite, and sleep are ok.  Ms. Rodriguez still noted  occasional dissociation related to postpartum experience with her son.  Writer will work closely with Ms. Rodriguez to process her birthing experience as well as providing skills to address current PPD symptoms. Pt ended session in good behavioral and emotional control. [FreeTextEntry1] : Ms. Rodriguez will continue with weekly therapy. Writer will also complete mid week phone check in.  IAP to be reviewed 1/2023.

## 2023-04-12 ENCOUNTER — APPOINTMENT (OUTPATIENT)
Dept: PSYCHIATRY | Facility: CLINIC | Age: 33
End: 2023-04-12

## 2023-04-13 NOTE — PLAN
[Integrative Therapy] : Integrative Therapy  [Psychodynamic Therapy] : Psychodynamic Therapy  [Recommended Frequency of Visits: ____] : Recommended frequency of visits: [unfilled] [Return in ____ week(s)] : Return in [unfilled] week(s) [FreeTextEntry2] : Goal A. Reduce symptoms of anxiety and depression postpartum.\par Objective A. Pt will utilize coping skills and strategies to more effectively manage anxiety. [de-identified] : Ms. Rodriguez arrived to session on time.  Ms. Rordiguez continued to discuss  struggling to feel assured and confident in her role as mom.  Ms. Rodriguez continued to report limited milk production.  Writer discussed and reviewed cognitive distortions and thoughts influencing emotions.  Writer will work closely with Ms. Rodriguez to process her birthing experience as well as providing skills to address current PPD symptoms. Pt ended session in good behavioral and emotional control. [FreeTextEntry1] : Ms. Rodriguez will continue with weekly therapy. Writer will also complete mid week phone check in.  IAP to be reviewed 1/2023.

## 2023-04-18 ENCOUNTER — APPOINTMENT (OUTPATIENT)
Dept: PSYCHIATRY | Facility: CLINIC | Age: 33
End: 2023-04-18

## 2023-04-22 ENCOUNTER — TRANSCRIPTION ENCOUNTER (OUTPATIENT)
Age: 33
End: 2023-04-22

## 2023-04-25 ENCOUNTER — APPOINTMENT (OUTPATIENT)
Dept: PSYCHIATRY | Facility: CLINIC | Age: 33
End: 2023-04-25

## 2023-04-26 ENCOUNTER — APPOINTMENT (OUTPATIENT)
Dept: PSYCHIATRY | Facility: CLINIC | Age: 33
End: 2023-04-26

## 2023-05-02 ENCOUNTER — APPOINTMENT (OUTPATIENT)
Dept: PSYCHIATRY | Facility: CLINIC | Age: 33
End: 2023-05-02

## 2023-05-04 ENCOUNTER — APPOINTMENT (OUTPATIENT)
Dept: PSYCHIATRY | Facility: CLINIC | Age: 33
End: 2023-05-04

## 2023-05-09 ENCOUNTER — APPOINTMENT (OUTPATIENT)
Dept: PSYCHIATRY | Facility: CLINIC | Age: 33
End: 2023-05-09

## 2023-05-09 NOTE — PLAN
[Integrative Therapy] : Integrative Therapy  [Psychodynamic Therapy] : Psychodynamic Therapy  [Recommended Frequency of Visits: ____] : Recommended frequency of visits: [unfilled] [Return in ____ week(s)] : Return in [unfilled] week(s) [FreeTextEntry2] : Goal A. Reduce symptoms of anxiety and depression postpartum.\par Objective A. Pt will utilize coping skills and strategies to more effectively manage anxiety. [de-identified] : Ms. Rodriguez arrived to session on time.  Ms. Rodriguez continued to discuss increasing negative thoughts around feeling "like a bad mom" and struggling to feel assured and confident in her role as mom.  Ms. Rodriguez continued to report limited milk production.  Writer continued to discuss and review cognitive distortions and thoughts influencing emotions.  Writer will work closely with Ms. Rodriguez to process her birthing experience as well as providing skills to address current PPD symptoms. Pt ended session in good behavioral and emotional control. [FreeTextEntry1] : Ms. Rodriguez will continue with weekly therapy. Writer will also complete mid week phone check in.  IAP to be reviewed 1/2023.

## 2023-05-11 ENCOUNTER — APPOINTMENT (OUTPATIENT)
Dept: PSYCHIATRY | Facility: CLINIC | Age: 33
End: 2023-05-11

## 2023-05-12 NOTE — PLAN
[Integrative Therapy] : Integrative Therapy  [Psychodynamic Therapy] : Psychodynamic Therapy  [Recommended Frequency of Visits: ____] : Recommended frequency of visits: [unfilled] [Return in ____ week(s)] : Return in [unfilled] week(s) [FreeTextEntry2] : Goal A. Reduce symptoms of anxiety and depression postpartum.\par Objective A. Pt will utilize coping skills and strategies to more effectively manage anxiety. [de-identified] : Ms. Rodriguez arrived to session on time.  Ms. Rodriguez continued to discuss negative thoughts around feeling "like a bad mom" and struggling to feel assured and confident in her role as mom. .  Writer continued to discuss and reviews skills and strategies to reduce negative thoughts influencing emotions.  Writer will work closely with Ms. Rodriguez to process her birthing experience as well as providing skills to address current PPD symptoms. Pt ended session in good behavioral and emotional control. [FreeTextEntry1] : Ms. Rodriguez will continue with weekly therapy. Writer will also complete mid week phone check in.  IAP to be reviewed 1/2023.

## 2023-05-16 ENCOUNTER — APPOINTMENT (OUTPATIENT)
Dept: PSYCHIATRY | Facility: CLINIC | Age: 33
End: 2023-05-16
Payer: COMMERCIAL

## 2023-05-16 PROCEDURE — 99214 OFFICE O/P EST MOD 30 MIN: CPT | Mod: 95
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
## 2023-05-16 NOTE — DISCUSSION/SUMMARY
[FreeTextEntry1] : 32 year old cis-gendered, heterosexual Polish American female, lives with her  in [place], works as  at [employer], currently on leave 2’ being 23 wks pregnant, due 2023, PPH of depression, anxiety, ADHD, h/o cutting in high school, in therapy since age 5 on and off, numerous medication trials, specifically Zoloft and paxil for anxiety, restarted on Zoloft 50mg at beginning of pregnancy for anxiety, denies substance abuse, h/o smoking, \par Referred by OBGYN, presents for assessment and treatment of increased anxiety during pregnancy.\par Pt found out she was pregnant in 2022.  This is her first pregnancy, no children, no FH of  depression or anxiety, planned pregnancy, on ST disability secondary to morning sickness, happy to be growing a child. Plans to breastfeed.  Unsure if she will cont to work after pregnancy.  In-laws will help with childcare. Last gyn exam was 2 wks.  LMP 3/30, FMP age 14.  \par She describes feelings of apathy, amotivation, catastrophizing, frequent crying spells, excessive worries, ruminations, restlessness, and guilt about fears of negatively impacting her pregnancy.  She states anxiety comes daily but is not constant.  No panic attacks, last PA was 4 years ago.  Denies SI/HI/AH/VH.  Denies h/o manic episodes, denies hypersexuality, increased spending, decreased need for sleep, impulsive behaviors.  Presents with euthymic mood.   \par PPH – SIB (cutting) in high school, has written suicide letters and had pills ready when in high school.  Since then no attempts, preparatory acts or SIB.  Aggressive behaviors in middle school, states didn’t know how to show affection or love at that time.  No aggressive behaviors since then.  Therapy since age 5, from [age] longest, on Zoloft, then Paxil for anxiety.  From [age], -, numerous medication trials including Sapphris, Wellbutrin (weight gain), Klonopin, Xanax, Vyvanse, Adderall (weight loss), propranolol, Paxil, Zoloft.  Has been treated for anxiety, depression, ADHD ( based off of restlessness, low concentration, energy, and trouble sleeping) and bipolar dx (based off “slut phase at age 20 - One prior provider suggested she may have bipolar disorder in the past.  Patient denies s/s of prior manic episodes and states she doesn’t think she has it.) No meds since age 25.  Restarted therapy at beginning of pregnancy.  Denies past psychiatric hospitalizations.  \par SUBS – alcohol once a year, none during pregnancy, h/o smoking age [age], pack a day, not smoking now.\par MEDS- Zoloft 50mg daily since 2022 during pregnancy, prescribed by OBGYN.  No side effects.  \par ALL- soy\par FH – coiusin with anxiety/depression, no FH of suicide, biological father abused opiates.\par SH- lives with , college – BS in [employer] science, identifies as Bahai  American, raised in [place], no developmental delays, public school, As & Bs, no learning disabilities noted, parents  when she was 3years old, moved around a lot with various family members, stepfather involved in life, close to him, limited interactions with her biological father, childhood neglect when with her father, mother was emotionally critical, close to her maternal extended family, close to cousin.   is most supportive relationship she’s ever had.  Losses – parental divorce, loss of childhood friend at age 9, loss of grandparent at age 19, grandmother last year from dementia. 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 Protected by responsibilities to family, support network, in therapy, reasons for living, future-oriented ,motivated for treatment .  Denies si/hi/ah/vh, future oriented, euthumic affect.\par DDX – anxiety, , r/o bipolar d/o\par PLAN – wants to transition care to [facility] treatment team\par Patient gives verbal consent to speak with patient’s . Nam Rodriguez [phone],  less than a year, known him for 7.5 years.\par Anxiety is much more manageable.  \par pt admitted to clinic - \par \par CLINIC COURSE:  [facility]  She began treatment with therapist.  She has been taking sertraline 50mg qhs with good effect since Aug 2022 and would like to continue it going forward.  RBA of medications discussed with patient. Describes anxiety as improving with brief, infrequent episodes of anxiety.  Denies SI/HI/AH/VH. No evidence of aftab or psychosis.  \par Had baby on 22, baby boy.  Jadiel.  Traumatic delivery, had to do emergent , which she wasn't prepared for.  Baby had to go NICU so delayed breast feeding.  Feeling emotionally unstable.  Crying often. Loves him, finds him to be really cute.  Baby sleeps ok, takes turns with . Denies SI/HI/AH/VH.  Denies thoughts of harming the baby.   feels that she gets anxious and cries often, but happy that they're doing a good job with the milk and taking care of the baby.  Continuing therapy with Rebeca.  Continues to take Zoloft 50mg daily, denies side effects.  States she just got a refill and doesn’t need rx today.Patient agreeable to increasing Zoloft to 75mg with plan to furhter increase if tolerated given hisotry of nausea.will f/u in 1 week.  23 - improving on on Zoloft 75mg. 1/10/23 zoloft increased to 100mg to better address low mood.  Patient improving on zoloft 100mg d, no longer having thoughts of wanting to be dead. 23 - stable on Zoloft 100mg daily, doing well.  Denies SI/HI/AH/VH.  No evidence of psycohsis or aftab.  - stable, doing well, denies si/hi/ah/vh, no thoughts of harming baby, euthymic affect.  Patient misses next appt and then does not return to see undersigned until 2023 stating that she had received refills from her OB and had continued to take meds throughout without side effects.  She remains stable, denies si/hi/ah/vh, denies thoughts of harming baby, future-oriented.  Baby is 4 mo old.  Pt aware of undersigned's coming leave and expects to f/u with coverage.\par \par PLAN:\par - Supportive therapy provided\par - continue individual PMAD therapy 1-2x weekly\par - cont Zoloft 100mg daily\par - Pt aware of emergency resources as needed
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
## 2023-05-18 ENCOUNTER — APPOINTMENT (OUTPATIENT)
Dept: PSYCHIATRY | Facility: CLINIC | Age: 33
End: 2023-05-18

## 2023-05-23 ENCOUNTER — APPOINTMENT (OUTPATIENT)
Dept: PSYCHIATRY | Facility: CLINIC | Age: 33
End: 2023-05-23

## 2023-05-25 ENCOUNTER — APPOINTMENT (OUTPATIENT)
Dept: PSYCHIATRY | Facility: CLINIC | Age: 33
End: 2023-05-25

## 2023-05-30 ENCOUNTER — APPOINTMENT (OUTPATIENT)
Dept: PSYCHIATRY | Facility: CLINIC | Age: 33
End: 2023-05-30

## 2023-06-01 ENCOUNTER — APPOINTMENT (OUTPATIENT)
Dept: PSYCHIATRY | Facility: CLINIC | Age: 33
End: 2023-06-01

## 2023-06-06 ENCOUNTER — APPOINTMENT (OUTPATIENT)
Dept: PSYCHIATRY | Facility: CLINIC | Age: 33
End: 2023-06-06

## 2023-06-08 ENCOUNTER — APPOINTMENT (OUTPATIENT)
Dept: PSYCHIATRY | Facility: CLINIC | Age: 33
End: 2023-06-08

## 2023-06-08 NOTE — PLAN
[Integrative Therapy] : Integrative Therapy  [Psychodynamic Therapy] : Psychodynamic Therapy  [Recommended Frequency of Visits: ____] : Recommended frequency of visits: [unfilled] [Return in ____ week(s)] : Return in [unfilled] week(s) [FreeTextEntry2] : Goal A. Reduce symptoms of anxiety and depression postpartum.\par Objective A. Pt will utilize coping skills and strategies to more effectively manage anxiety. [de-identified] : Ms. Rodriguez arrived to session on time.  Ms. Rodriguez discussed beginning a new job this week and managing emotions as she adjusts back to work life as a new mother.  Writer continued to discuss and reviews skills and strategies to reduce negative thoughts influencing emotions.  Writer will work closely with Ms. Rodriguez to process her birthing experience as well as providing skills to address current PPD symptoms. Pt ended session in good behavioral and emotional control. [FreeTextEntry1] : Ms. Rodriguez will continue with weekly therapy. Writer will also complete mid week phone check in.  IAP to be reviewed 1/2023.

## 2023-06-12 ENCOUNTER — APPOINTMENT (OUTPATIENT)
Dept: PSYCHIATRY | Facility: CLINIC | Age: 33
End: 2023-06-12

## 2023-06-13 ENCOUNTER — APPOINTMENT (OUTPATIENT)
Dept: PSYCHIATRY | Facility: CLINIC | Age: 33
End: 2023-06-13
Payer: COMMERCIAL

## 2023-06-13 PROCEDURE — 99214 OFFICE O/P EST MOD 30 MIN: CPT | Mod: 95

## 2023-06-13 NOTE — PLAN
[Integrative Therapy] : Integrative Therapy  [Psychodynamic Therapy] : Psychodynamic Therapy  [Recommended Frequency of Visits: ____] : Recommended frequency of visits: [unfilled] [Return in ____ week(s)] : Return in [unfilled] week(s) [FreeTextEntry2] : Goal A. Reduce symptoms of anxiety and depression postpartum.\par Objective A. Pt will utilize coping skills and strategies to more effectively manage anxiety. [de-identified] : Ms. Rodriguez arrived to session on time.  Ms. Rodriguez discussed continuikng to transition to her new job and managing emotions as she adjusts back to work life as a new mother.  Writer continued to discuss and reviews skills and strategies to reduce negative thoughts influencing emotions.  Writer will work closely with Ms. Rodriguez to process her birthing experience as well as providing skills to address current PPD symptoms. Pt ended session in good behavioral and emotional control. [FreeTextEntry1] : Ms. Rodriguez will continue with weekly therapy. Writer will also complete mid week phone check in.  IAP to be reviewed 1/2023.

## 2023-06-15 ENCOUNTER — APPOINTMENT (OUTPATIENT)
Dept: PSYCHIATRY | Facility: CLINIC | Age: 33
End: 2023-06-15

## 2023-06-19 ENCOUNTER — APPOINTMENT (OUTPATIENT)
Dept: PSYCHIATRY | Facility: CLINIC | Age: 33
End: 2023-06-19

## 2023-06-22 ENCOUNTER — APPOINTMENT (OUTPATIENT)
Dept: PSYCHIATRY | Facility: CLINIC | Age: 33
End: 2023-06-22

## 2023-06-23 NOTE — PLAN
[Integrative Therapy] : Integrative Therapy  [Psychodynamic Therapy] : Psychodynamic Therapy  [Recommended Frequency of Visits: ____] : Recommended frequency of visits: [unfilled] [Return in ____ week(s)] : Return in [unfilled] week(s) [FreeTextEntry2] : Goal A. Reduce symptoms of anxiety and depression postpartum.\par Objective A. Pt will utilize coping skills and strategies to more effectively manage anxiety. [de-identified] : Ms. Rodriguez arrived to session on time.  Ms. Rodriguez discussed navigating interactions with family member as it relates to her own beliefs in parenting and child rearing as a new mother.  Writer continued to discuss and reviews skills and strategies to reduce negative thoughts influencing emotions.  Writer will work closely with Ms. Rodriguez to process her birthing experience as well as providing skills to address current PPD symptoms. Pt ended session in good behavioral and emotional control. [FreeTextEntry1] : Ms. Rodriguez will continue with weekly therapy.   IAP to be reviewed 1/2023.

## 2023-06-27 ENCOUNTER — APPOINTMENT (OUTPATIENT)
Dept: PSYCHIATRY | Facility: CLINIC | Age: 33
End: 2023-06-27

## 2023-07-11 ENCOUNTER — APPOINTMENT (OUTPATIENT)
Dept: PSYCHIATRY | Facility: CLINIC | Age: 33
End: 2023-07-11

## 2023-07-13 ENCOUNTER — APPOINTMENT (OUTPATIENT)
Age: 33
End: 2023-07-13
Payer: COMMERCIAL

## 2023-07-13 PROCEDURE — 99213 OFFICE O/P EST LOW 20 MIN: CPT | Mod: 95

## 2023-07-13 NOTE — HISTORY OF PRESENT ILLNESS
[FreeTextEntry1] : Covering for Dr Giron\par \par Chart reviewed\par \par 34yo F, hx depression, anxiety, ADHD, hx NSSI and suicidal preparations in high school, none since, has 7mo baby at home, presents for med management f/u.  Pt reports feeling "good", describes stable mood, recently started work again ( working from home), describes manageable situation, anxiety controlled.  No suicidal thinking.  Pt reports feeling frustrated at times, no yelling, violence, or thoughts to harm baby or anyone.  No persistent irritability.  Sleep WNL.  Pt reports good focus at work, is accomplishing tasks, describes good support system.  Therapy is going well.  No side effects to zoloft, pt happy with dose, seeks no changes.  No new medical issues.  No substance use.

## 2023-07-13 NOTE — DISCUSSION/SUMMARY
[FreeTextEntry1] : Per prior note: \par "Clinical Summary: 32 year old cis-gendered, heterosexual Telugu American female, lives with her  in [place], works as  at [employer], currently on leave 2’ being 23 wks pregnant, due 2023, PPH of depression, anxiety, ADHD, h/o cutting in high school, in therapy since age 5 on and off, numerous medication trials, specifically Zoloft and paxil for anxiety, restarted on Zoloft 50mg at beginning of pregnancy for anxiety, denies substance abuse, h/o smoking, \par Referred by OBGYN, presents for assessment and treatment of increased anxiety during pregnancy.\par Pt found out she was pregnant in 2022. This is her first pregnancy, no children, no FH of  depression or anxiety, planned pregnancy, on ST disability secondary to morning sickness, happy to be growing a child. Plans to breastfeed. Unsure if she will cont to work after pregnancy. In-laws will help with childcare. Last gyn exam was 2 wks. LMP 3, FMP age 14. \par She describes feelings of apathy, amotivation, catastrophizing, frequent crying spells, excessive worries, ruminations, restlessness, and guilt about fears of negatively impacting her pregnancy. She states anxiety comes daily but is not constant. No panic attacks, last PA was 4 years ago. Denies SI/HI/AH/VH. Denies h/o manic episodes, denies hypersexuality, increased spending, decreased need for sleep, impulsive behaviors. Presents with euthymic mood. \par PPH – SIB (cutting) in high school, has written suicide letters and had pills ready when in high school. Since then no attempts, preparatory acts or SIB. Aggressive behaviors in middle school, states didn’t know how to show affection or love at that time. No aggressive behaviors since then. Therapy since age 5, from [age] longest, on Zoloft, then Paxil for anxiety. From -15, -, numerous medication trials including Sapphris, Wellbutrin (weight gain), Klonopin, Xanax, Vyvanse, Adderall (weight loss), propranolol, Paxil, Zoloft. Has been treated for anxiety, depression, ADHD ( based off of restlessness, low concentration, energy, and trouble sleeping) and bipolar dx (based off “slut phase at age 20 - One prior provider suggested she may have bipolar disorder in the past. Patient denies s/s of prior manic episodes and states she doesn’t think she has it.) No meds since age 25. Restarted therapy at beginning of pregnancy. Denies past psychiatric hospitalizations. \par SUBS – alcohol once a year, none during pregnancy, h/o smoking age [age], pack a day, not smoking now.\par MEDS- Zoloft 50mg daily since 2022 during pregnancy, prescribed by OBGYN. No side effects. \par ALL- soy\par FH – coiusin with anxiety/depression, no FH of suicide, biological father abused opiates.\par SH- lives with , college – BS in [employer] science, identifies as Rastafarian  American, raised in [place], no developmental delays, public school, As & Bs, no learning disabilities noted, parents  when she was 3years old, moved around a lot with various family members, stepfather involved in life, close to him, limited interactions with her biological father, childhood neglect when with her father, mother was emotionally critical, close to her maternal extended family, close to cousin.  is most supportive relationship she’s ever had. Losses – parental divorce, loss of childhood friend at age 9, loss of grandparent at age 19, grandmother last year from dementia. 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 Protected by responsibilities to family, support network, in therapy, reasons for living, future-oriented ,motivated for treatment. Denies si/hi/ah/vh, future oriented, euthumic affect.\par DDX – anxiety, , r/o bipolar d/o\par PLAN – wants to transition care to [facility] treatment team\par Patient gives verbal consent to speak with patient’s . Nam Rodriguez [phone],  less than a year, known him for 7.5 years.\par Anxiety is much more manageable. \par pt admitted to clinic - \par \par CLINIC COURSE: Patient transitions her care to [facility], PMAD clinicians. She began treatment with therapist. She has been taking sertraline 50mg qhs with good effect since Aug 2022 and would like to continue it going forward. RBA of medications discussed with patient. Describes anxiety as improving with brief, infrequent episodes of anxiety. Denies SI/HI/AH/VH. No evidence of aftab or psychosis. \par Had baby on 22, baby boy. Jadiel. Traumatic delivery, had to do emergent , which she wasn't prepared for. Baby had to go NICU so delayed breast feeding. Feeling emotionally unstable. Crying often. Loves him, finds him to be really cute. Baby sleeps ok, takes turns with . Denies SI/HI/AH/VH. Denies thoughts of harming the baby.  feels that she gets anxious and cries often, but happy that they're doing a good job with the milk and taking care of the baby. Continuing therapy with Rebeca. Continues to take Zoloft 50mg daily, denies side effects. States she just got a refill and doesn’t need rx today.Patient agreeable to increasing Zoloft to 75mg with plan to furhter increase if tolerated given hisotry of nausea.will f/u in 1 week. 23 - improving on on Zoloft 75mg. 1/10/23 zoloft increased to 100mg to better address low mood. Patient improving on zoloft 100mg d, no longer having thoughts of wanting to be dead. 23 - stable on Zoloft 100mg daily, doing well. Denies SI/HI/AH/VH. No evidence of psycohsis or aftab.  - stable, doing well, denies si/hi/ah/vh, no thoughts of harming baby, euthymic affect. Patient misses next appt and then does not return to see undersigned until 2023 stating that she had received refills from her OB and had continued to take meds throughout without side effects. She remains stable, denies si/hi/ah/vh, denies thoughts of harming baby, future-oriented. Baby is 4 mo old. Pt aware of undersigned's coming leave and expects to f/u with coverage."\par \par 23- pt with passive SI several times/wk, working with therapist, pt very insightful and able to manage, agrees we keep rx stable at this time as job stress may be contributing factor that is expected to resolve, not in MDE, no sx c/w elevated/mixed episode or psychosis, agrees we f/u in 4wks, she will make sooner appt if needed, schedules f/u  at 9:30am, has [facility] clinic contact info.\par \par 23- sx stable, some situational frustration with going back to work, pt not in mood episode or with uncontrolled sx, no evidence for acute risk, continue current plan, pt happy with plan\par \par -continue zoloft 100mg PO daily #35 today\par -weekly therapy, discuss situational frustrations and coping\par -f/u  at 9:30am

## 2023-07-13 NOTE — PLAN
[Integrative Therapy] : Integrative Therapy  [Psychodynamic Therapy] : Psychodynamic Therapy  [Recommended Frequency of Visits: ____] : Recommended frequency of visits: [unfilled] [Return in ____ week(s)] : Return in [unfilled] week(s) [FreeTextEntry2] : Goal A. Reduce symptoms of anxiety and depression postpartum.\par Objective A. Pt will utilize coping skills and strategies to more effectively manage anxiety. [de-identified] : Ms. Rodriguez arrived to session on time.  Ms. Rodriguez continued to discuss navigating interactions with family members as it relates to her own beliefs in parenting and child rearing as a new mother.  Writer continued to discuss and reviews skills and strategies to reduce negative thoughts influencing emotions.  Writer will work closely with Ms. Rodriguez to process her current emotional experiences as a first time mother as well as providing skills to address current PPD symptoms. Pt ended session in good behavioral and emotional control. [FreeTextEntry1] : Ms. Rodriguez will continue with weekly therapy.   IAP to be reviewed 1/2023.

## 2023-07-13 NOTE — PHYSICAL EXAM
[Well groomed] : well groomed [Average] : average [Cooperative] : cooperative [Euthymic] : euthymic [Full] : full [Clear] : clear [Linear/Goal Directed] : linear/goal directed [None] : none [None Reported] : none reported [Above average] : above average [WNL] : within normal limits

## 2023-07-13 NOTE — RISK ASSESSMENT
[No, patient denies ideation or behavior] : No, patient denies ideation or behavior [FreeTextEntry8] : low acute risk, pt with controlled sx, future oriented, help-seeking, in therapy, no interim SI [Low acute suicide risk] : Low acute suicide risk [Yes] : Yes [FreeTextEntry2] : hx

## 2023-07-13 NOTE — REASON FOR VISIT
[Telehealth (audio & video) - Individual/Group] : This visit was provided via telehealth using real-time 2-way audio visual technology. [Other Location: e.g. Home (Enter Location, City,State)___] : The provider was located at [unfilled]. [Home] : The patient, [unfilled], was located at home, [unfilled], at the time of the visit. [Verbal consent obtained from patient/other participant(s)] : Verbal consent for telehealth/telephonic services obtained from patient/other participant(s) [Patient] : Patient

## 2023-07-18 ENCOUNTER — APPOINTMENT (OUTPATIENT)
Dept: PSYCHIATRY | Facility: CLINIC | Age: 33
End: 2023-07-18

## 2023-07-20 NOTE — PLAN
[Integrative Therapy] : Integrative Therapy  [Psychodynamic Therapy] : Psychodynamic Therapy  [Recommended Frequency of Visits: ____] : Recommended frequency of visits: [unfilled] [Return in ____ week(s)] : Return in [unfilled] week(s) [FreeTextEntry2] : Goal A. Reduce symptoms of anxiety and depression postpartum.\par Objective A. Pt will utilize coping skills and strategies to more effectively manage anxiety. [de-identified] : Ms. Rodriguez arrived to session on time.  Ms. Rodriguez continued to discuss navigating interactions with family members and conflict anticipated at an upcoming family reunion.  Writer continued to discuss and reviews skills and strategies to reduce negative thoughts influencing emotions.  Writer will work closely with Ms. Rodriguez to process her current emotional experiences as a first time mother as well as providing skills to address current PPD symptoms. Pt ended session in good behavioral and emotional control. [FreeTextEntry1] : Ms. Rodriguez will continue with weekly therapy.   IAP to be reviewed 8/2023.

## 2023-07-20 NOTE — REASON FOR VISIT
[Patient preference] : as per patient preference [Telehealth (audio & video) - Individual/Group] : This visit was provided via telehealth using real-time 2-way audio visual technology. [Home] : The patient, [unfilled], was located at home, [unfilled], at the time of the visit. [Verbal consent obtained from patient/other participant(s)] : Verbal consent for telehealth/telephonic services obtained from patient/other participant(s) [Patient] : Patient [FreeTextEntry5] : 1:45 [FreeTextEntry4] : 1:00 [FreeTextEntry1] : Pt arrived for weekly psychotherapy appointment

## 2023-07-25 ENCOUNTER — APPOINTMENT (OUTPATIENT)
Dept: PSYCHIATRY | Facility: CLINIC | Age: 33
End: 2023-07-25

## 2023-08-08 ENCOUNTER — APPOINTMENT (OUTPATIENT)
Dept: PSYCHIATRY | Facility: CLINIC | Age: 33
End: 2023-08-08
Payer: COMMERCIAL

## 2023-08-08 ENCOUNTER — APPOINTMENT (OUTPATIENT)
Dept: PSYCHIATRY | Facility: CLINIC | Age: 33
End: 2023-08-08

## 2023-08-08 PROCEDURE — ZZZZZ: CPT

## 2023-08-09 NOTE — PLAN
[FreeTextEntry2] : Goal A. Reduce symptoms of anxiety and depression postpartum.\par  Objective A. Pt will utilize coping skills and strategies to more effectively manage anxiety. [Integrative Therapy] : Integrative Therapy  [Psychodynamic Therapy] : Psychodynamic Therapy  [de-identified] : Ms. Rodriguez arrived to session on time.  Ms. Rodriguez discussed family reunion and adjustment to son in .  Writer acknowledged final session before transfer to work with a new therapist.  Writer and Ms. Rodriguez discussed continued treatment goals (motivation for improving her own mental health for her son) to be continued with her new therapist after transfer. Pt ended session in good behavioral and emotional control. [Recommended Frequency of Visits: ____] : Recommended frequency of visits: [unfilled] [Return in ____ week(s)] : Return in [unfilled] week(s) [FreeTextEntry1] : Ms. Rodriguez will continue with weekly therapy with new therapist.   IAP to be reviewed 8/2023.

## 2023-08-09 NOTE — REASON FOR VISIT
[Patient preference] : as per patient preference [Telehealth (audio & video) - Individual/Group] : This visit was provided via telehealth using real-time 2-way audio visual technology. [Home] : The patient, [unfilled], was located at home, [unfilled], at the time of the visit. [Verbal consent obtained from patient/other participant(s)] : Verbal consent for telehealth/telephonic services obtained from patient/other participant(s) [FreeTextEntry4] : 1:00 [FreeTextEntry5] : 1:45 [Patient] : Patient [FreeTextEntry1] : Pt arrived for weekly psychotherapy appointment

## 2023-08-16 ENCOUNTER — TRANSCRIPTION ENCOUNTER (OUTPATIENT)
Age: 33
End: 2023-08-16

## 2023-08-16 RX ORDER — NORETHINDRONE 0.35 MG/1
0.35 TABLET ORAL
Qty: 2 | Refills: 3 | Status: ACTIVE | COMMUNITY
Start: 2023-08-16 | End: 1900-01-01

## 2023-08-16 RX ORDER — NORETHINDRONE ACETATE/ETHINYL ESTRADIOL AND FERROUS FUMARATE 1MG-20(21)
1-20 KIT ORAL
Qty: 3 | Refills: 3 | Status: DISCONTINUED | COMMUNITY
Start: 2023-02-02 | End: 2023-08-16

## 2023-08-17 ENCOUNTER — APPOINTMENT (OUTPATIENT)
Age: 33
End: 2023-08-17
Payer: COMMERCIAL

## 2023-08-17 PROCEDURE — 99214 OFFICE O/P EST MOD 30 MIN: CPT | Mod: 95

## 2023-08-17 NOTE — HISTORY OF PRESENT ILLNESS
[FreeTextEntry1] : Covering for Dr Giron  Chart reviewed  32yo F, hx depression, anxiety, ADHD, hx NSSI and suicidal preparations in high school, none since, has 8mo baby at home, presents for med management f/u.  Difficulty connecting session initially then resolved.  Pt reports "things are good" with no new/worsening sx, good appetite and WNL sleep.  Mood stable.  She has no active thoughts of suicide but wonders if it would be better if she weren't around, has this brief thought a few times per week, this is baseline for her.  Pt with somewhat diminished concentration and motivation, some sadness that baby is starting .  No uncontrolled sx, no impairment of functioning.  Pt seeks to keep rx stable at this time.  She ended therapy with Rebeca and is waiting for new therapist.  No side effects. [Suicidal Behavior/Ideation] : suicidal behavior/ideation

## 2023-08-17 NOTE — RISK ASSESSMENT
[FreeTextEntry8] : Pt with thoughts of it being better if she weren't here, this is fleeting and unchanged from baseline, pt clear no thoughts to end her life or desire, no intent/plan, no desperation hopelessness global insomnia, akathisia , uncontroleld anxiety, or other markers of acute risk.  Acute risk is low at this time.   [Low acute suicide risk] : Low acute suicide risk [Yes] : Yes

## 2023-08-17 NOTE — DISCUSSION/SUMMARY
[FreeTextEntry1] : Per prior note:  "Clinical Summary: 32 year old cis-gendered, heterosexual Pashto American female, lives with her  in [place], works as  at [employer], currently on leave 2' being 23 wks pregnant, due 2023, PPH of depression, anxiety, ADHD, h/o cutting in high school, in therapy since age 5 on and off, numerous medication trials, specifically Zoloft and paxil for anxiety, restarted on Zoloft 50mg at beginning of pregnancy for anxiety, denies substance abuse, h/o smoking,  Referred by OBGYN, presents for assessment and treatment of increased anxiety during pregnancy.  Pt found out she was pregnant in 2022. This is her first pregnancy, no children, no FH of  depression or anxiety, planned pregnancy, on ST disability secondary to morning sickness, happy to be growing a child. Plans to breastfeed. Unsure if she will cont to work after pregnancy. In-laws will help with childcare. Last gyn exam was 2 wks. LMP 3, FMP age 14.  She describes feelings of apathy, amotivation, catastrophizing, frequent crying spells, excessive worries, ruminations, restlessness, and guilt about fears of negatively impacting her pregnancy. She states anxiety comes daily but is not constant. No panic attacks, last PA was 4 years ago. Denies SI/HI/AH/VH. Denies h/o manic episodes, denies hypersexuality, increased spending, decreased need for sleep, impulsive behaviors. Presents with euthymic mood.  PPH - SIB (cutting) in high school, has written suicide letters and had pills ready when in high school. Since then no attempts, preparatory acts or SIB. Aggressive behaviors in middle school, states didn't know how to show affection or love at that time. No aggressive behaviors since then. Therapy since age 5, from [age] longest, on Zoloft, then Paxil for anxiety. From [age], -, numerous medication trials including Sapphris, Wellbutrin (weight gain), Klonopin, Xanax, Vyvanse, Adderall (weight loss), propranolol, Paxil, Zoloft. Has been treated for anxiety, depression, ADHD ( based off of restlessness, low concentration, energy, and trouble sleeping) and bipolar dx (based off "slut phase at age 20 - One prior provider suggested she may have bipolar disorder in the past. Patient denies s/s of prior manic episodes and states she doesn't think she has it.) No meds since age 25. Restarted therapy at beginning of pregnancy. Denies past psychiatric hospitalizations.  SUBS - alcohol once a year, none during pregnancy, h/o smoking age [age], pack a day, not smoking now.  MEDS- Zoloft 50mg daily since 2022 during pregnancy, prescribed by OBGYN. No side effects.  ALL- soy  FH - coiusin with anxiety/depression, no FH of suicide, biological father abused opiates.  SH- lives with , college - BS in [employer] science, identifies as Quaker  American, raised in [place], no developmental delays, public school, As & Bs, no learning disabilities noted, parents  when she was 3years old, moved around a lot with various family members, stepfather involved in life, close to him, limited interactions with her biological father, childhood neglect when with her father, mother was emotionally critical, close to her maternal extended family, close to cousin.  is most supportive relationship she's ever had. Losses - parental divorce, loss of childhood friend at age 9, loss of grandparent at age 19, grandmother last year from dementia. 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  RISK - Low acute risk of self harm. Protected by responsibilities to family, support network, in therapy, reasons for living, future-oriented ,motivated for treatment. Denies si/hi/ah/vh, future oriented, euthumic affect.  DDX - anxiety, , r/o bipolar d/o  PLAN - wants to transition care to [facility] treatment team  Patient gives verbal consent to speak with patient's . Nam Rodriguez [phone],  less than a year, known him for 7.5 years.  Anxiety is much more manageable.  pt admitted to clinic -    CLINIC COURSE: [facility] She began treatment with therapist. She has been taking sertraline 50mg qhs with good effect since Aug 2022 and would like to continue it going forward. RBA of medications discussed with patient. Describes anxiety as improving with brief, infrequent episodes of anxiety. Denies SI/HI/AH/VH. No evidence of aftab or psychosis.  Had baby on 22, baby boy. Jadiel. Traumatic delivery, had to do emergent , which she wasn't prepared for. Baby had to go NICU so delayed breast feeding. Feeling emotionally unstable. Crying often. Loves him, finds him to be really cute. Baby sleeps ok, takes turns with . Denies SI/HI/AH/VH. Denies thoughts of harming the baby.  feels that she gets anxious and cries often, but happy that they're doing a good job with the milk and taking care of the baby. Continuing therapy with Rebeca. Continues to take Zoloft 50mg daily, denies side effects. States she just got a refill and doesn't need rx today.Patient agreeable to increasing Zoloft to 75mg with plan to furhter increase if tolerated given hisotry of nausea.will f/u in 1 week. 23 - improving on on Zoloft 75mg. 1/10/23 zoloft increased to 100mg to better address low mood. Patient improving on zoloft 100mg d, no longer having thoughts of wanting to be dead. 23 - stable on Zoloft 100mg daily, doing well. Denies SI/HI/AH/VH. No evidence of psycohsis or aftab.  - stable, doing well, denies si/hi/ah/vh, no thoughts of harming baby, euthymic affect. Patient misses next appt and then does not return to see undersigned until 2023 stating that she had received refills from her OB and had continued to take meds throughout without side effects. She remains stable, denies si/hi/ah/vh, denies thoughts of harming baby, future-oriented. Baby is 4 mo old. Pt aware of undersigned's coming leave and expects to f/u with coverage."    23- pt with passive SI several times/wk, working with therapist, pt very insightful and able to manage, agrees we keep rx stable at this time as job stress may be contributing factor that is expected to resolve, not in MDE, no sx c/w elevated/mixed episode or psychosis, agrees we f/u in 4wks, she will make sooner appt if needed, schedules f/u  at 9:30am, has [facility] clinic contact info.  23- sx stable, some situational frustration with going back to work, pt not in mood episode or with uncontrolled sx, no evidence for acute risk, continue current plan, pt happy with plan  23-  pt  reports no significant changes,  still has some depressive sx but controlled, keep Rx stable at this time, pt aware we can meet sooner if needed (transitioning therapists at [facility]), no SI/HI  -continue zoloft 100mg PO daily #30 today -weekly therapy, transitioning  -f/u  at 11am

## 2023-08-17 NOTE — PHYSICAL EXAM
[Well groomed] : well groomed [Average] : average [Cooperative] : cooperative [Euthymic] : euthymic [Constricted] : constricted [Clear] : clear [Linear/Goal Directed] : linear/goal directed [None] : none [None Reported] : none reported [Above average] : above average [WNL] : within normal limits

## 2023-08-25 ENCOUNTER — TRANSCRIPTION ENCOUNTER (OUTPATIENT)
Age: 33
End: 2023-08-25

## 2023-08-29 ENCOUNTER — TRANSCRIPTION ENCOUNTER (OUTPATIENT)
Age: 33
End: 2023-08-29

## 2023-09-08 ENCOUNTER — APPOINTMENT (OUTPATIENT)
Dept: OBGYN | Facility: CLINIC | Age: 33
End: 2023-09-08

## 2023-09-14 ENCOUNTER — APPOINTMENT (OUTPATIENT)
Age: 33
End: 2023-09-14
Payer: COMMERCIAL

## 2023-09-14 PROCEDURE — 99214 OFFICE O/P EST MOD 30 MIN: CPT | Mod: 95

## 2023-09-28 ENCOUNTER — NON-APPOINTMENT (OUTPATIENT)
Age: 33
End: 2023-09-28

## 2023-10-05 ENCOUNTER — APPOINTMENT (OUTPATIENT)
Dept: PSYCHIATRY | Facility: CLINIC | Age: 33
End: 2023-10-05

## 2023-10-11 ENCOUNTER — APPOINTMENT (OUTPATIENT)
Age: 33
End: 2023-10-11
Payer: COMMERCIAL

## 2023-10-11 PROCEDURE — 99213 OFFICE O/P EST LOW 20 MIN: CPT | Mod: 95

## 2023-10-12 ENCOUNTER — APPOINTMENT (OUTPATIENT)
Dept: PSYCHIATRY | Facility: CLINIC | Age: 33
End: 2023-10-12

## 2023-10-19 ENCOUNTER — APPOINTMENT (OUTPATIENT)
Dept: PSYCHIATRY | Facility: CLINIC | Age: 33
End: 2023-10-19

## 2023-10-26 ENCOUNTER — APPOINTMENT (OUTPATIENT)
Dept: PSYCHIATRY | Facility: CLINIC | Age: 33
End: 2023-10-26

## 2023-11-01 ENCOUNTER — APPOINTMENT (OUTPATIENT)
Dept: PSYCHIATRY | Facility: CLINIC | Age: 33
End: 2023-11-01

## 2023-11-01 ENCOUNTER — OUTPATIENT (OUTPATIENT)
Dept: OUTPATIENT SERVICES | Facility: HOSPITAL | Age: 33
LOS: 1 days | Discharge: HOME | End: 2023-11-01

## 2023-11-02 ENCOUNTER — APPOINTMENT (OUTPATIENT)
Dept: PSYCHIATRY | Facility: CLINIC | Age: 33
End: 2023-11-02

## 2023-11-08 ENCOUNTER — APPOINTMENT (OUTPATIENT)
Age: 33
End: 2023-11-08
Payer: COMMERCIAL

## 2023-11-08 ENCOUNTER — APPOINTMENT (OUTPATIENT)
Dept: PSYCHIATRY | Facility: CLINIC | Age: 33
End: 2023-11-08

## 2023-11-08 PROCEDURE — 99213 OFFICE O/P EST LOW 20 MIN: CPT | Mod: 95

## 2023-11-15 ENCOUNTER — APPOINTMENT (OUTPATIENT)
Dept: PSYCHIATRY | Facility: CLINIC | Age: 33
End: 2023-11-15

## 2023-11-29 ENCOUNTER — APPOINTMENT (OUTPATIENT)
Dept: PSYCHIATRY | Facility: CLINIC | Age: 33
End: 2023-11-29

## 2023-12-06 ENCOUNTER — APPOINTMENT (OUTPATIENT)
Dept: PSYCHIATRY | Facility: CLINIC | Age: 33
End: 2023-12-06
Payer: COMMERCIAL

## 2023-12-06 PROCEDURE — 99213 OFFICE O/P EST LOW 20 MIN: CPT | Mod: 95

## 2023-12-13 ENCOUNTER — APPOINTMENT (OUTPATIENT)
Dept: PSYCHIATRY | Facility: CLINIC | Age: 33
End: 2023-12-13

## 2023-12-13 ENCOUNTER — NON-APPOINTMENT (OUTPATIENT)
Age: 33
End: 2023-12-13

## 2023-12-20 ENCOUNTER — APPOINTMENT (OUTPATIENT)
Dept: PSYCHIATRY | Facility: CLINIC | Age: 33
End: 2023-12-20

## 2023-12-27 ENCOUNTER — APPOINTMENT (OUTPATIENT)
Dept: PSYCHIATRY | Facility: CLINIC | Age: 33
End: 2023-12-27

## 2024-01-03 ENCOUNTER — APPOINTMENT (OUTPATIENT)
Dept: PSYCHIATRY | Facility: CLINIC | Age: 34
End: 2024-01-03
Payer: COMMERCIAL

## 2024-01-03 PROCEDURE — 99213 OFFICE O/P EST LOW 20 MIN: CPT | Mod: 95

## 2024-01-03 NOTE — PLAN
[Integrative Therapy] : Integrative Therapy  [Recommended Frequency of Visits: ____] : Recommended frequency of visits: [unfilled] [Return in ____ week(s)] : Return in [unfilled] week(s) [FreeTextEntry2] : Goal A. Reduce symptoms of postpartum anxiety and depression   Objective A. Pt will utilize coping skills and strategies to more effectively manage symptoms and familial relationships Goal B. Pt wants to make sure her son grows up with better relationships than she did  Objective B: Increase insight around interpersonal relationship dynamics   [de-identified] : Patient arrived to the zoom session on-time. Patient wanted to focus on two main points: 1) getting COVID and 2) her in-laws. Patient said that she tested positive right before the holidays and her son's 1st birthday too. Patient's  and her mother also got sick, so it became increasingly difficult for them to take care of the baby and get well themselves. Patient was frustrated by the lack of support from her in-laws and "no longer sees them as family". Writer utilized a psychodynamic approach to develop more insight around patient's feelings. Patient described how she and her mother went into planning mode to devise an alternative solution and how she sought to separate herself (psychologically/emotionally) from a familial relationship with her in-laws. Writer asked open-ended questions to get a sense of what this re-categorization does for the patient. Patient said that in her culture, family helps other family members when they are needed. Writer acknowledged the patient's internal shift in order to lessen her expectations of others. Patient ended session in good behavioral and emotional control. [FreeTextEntry1] : 1 x wk psychotherapy with Ophelia Patel

## 2024-01-03 NOTE — REASON FOR VISIT
[Patient preference] : as per patient preference [Telehealth (audio & video) - Individual/Group] : This visit was provided via telehealth using real-time 2-way audio visual technology. [Other Location: e.g. Home (Enter Location, City,State)___] : The provider was located at [unfilled]. [Home] : The patient, [unfilled], was located at home, [unfilled], at the time of the visit. [Verbal consent obtained from patient/other participant(s)] : Verbal consent for telehealth/telephonic services obtained from patient/other participant(s) [Patient] : Patient [FreeTextEntry4] : 11:30am [FreeTextEntry5] : 12:15pm [FreeTextEntry1] : Individual therapy for patient's postpartum mental health

## 2024-01-03 NOTE — END OF VISIT
[Duration of Psychotherapy Visit (minutes spent in synchronous communication): ____] : Duration of Psychotherapy Visit (minutes spent in synchronous communication): [unfilled] [Individual Psychotherapy for 38-52 minutes] : Individual Psychotherapy for 38 - 52 minutes [Teletherapy Service Provided] : The services provided in this session were delivered via tele-therapy [FreeTextEntry3] : Home in [place], [place] [FreeTextEntry5] : Home in [place], [place]

## 2024-01-03 NOTE — HISTORY OF PRESENT ILLNESS
[FreeTextEntry1] : Covering for Dr Giron  Chart reviewed  34yo F, hx depression, anxiety, ADHD, hx NSSI and suicidal preparations in high school, none since, has 10mo baby at home, presents for med management f/u.    Pt reports getting COVID 2wks ago and has been fatigued, needing lots of sleep, having some difficulty concentrating.  Family members have RSV.  Mood stable despite illness and stressors.  No hopelessness, worthlessness, grossly impaired decision making,  No suicidal thoughts or self-harm thoughts.  Pt does not describe uncontrolled worry or irritability. Pt with no new/worsening sx otherwise, is continuing therapy which is going well.  No AE of zoloft, pt wishes to continue.

## 2024-01-03 NOTE — RISK ASSESSMENT
[No, patient denies ideation or behavior] : No, patient denies ideation or behavior [FreeTextEntry8] : pt with stable sx, engaged in therapy, no SI, no unmanaged sx or desperation, has strong social support and connections, acute risk low.  Chronic risk elevated janene per hx. [Low acute suicide risk] : Low acute suicide risk [Yes] : Yes

## 2024-01-03 NOTE — DISCUSSION/SUMMARY
[FreeTextEntry1] : Per prior note:  "Clinical Summary: 32 year old cis-gendered, heterosexual Tajik American female, lives with her  in [place], works as  at [employer], currently on leave 2' being 23 wks pregnant, due 2023, PPH of depression, anxiety, ADHD, h/o cutting in high school, in therapy since age 5 on and off, numerous medication trials, specifically Zoloft and paxil for anxiety, restarted on Zoloft 50mg at beginning of pregnancy for anxiety, denies substance abuse, h/o smoking,  Referred by OBGYN, presents for assessment and treatment of increased anxiety during pregnancy.  Pt found out she was pregnant in 2022. This is her first pregnancy, no children, no FH of  depression or anxiety, planned pregnancy, on ST disability secondary to morning sickness, happy to be growing a child. Plans to breastfeed. Unsure if she will cont to work after pregnancy. In-laws will help with childcare. Last gyn exam was 2 wks. LMP 3, FMP age 14.  She describes feelings of apathy, amotivation, catastrophizing, frequent crying spells, excessive worries, ruminations, restlessness, and guilt about fears of negatively impacting her pregnancy. She states anxiety comes daily but is not constant. No panic attacks, last PA was 4 years ago. Denies SI/HI/AH/VH. Denies h/o manic episodes, denies hypersexuality, increased spending, decreased need for sleep, impulsive behaviors. Presents with euthymic mood.  PPH - SIB (cutting) in high school, has written suicide letters and had pills ready when in high school. Since then no attempts, preparatory acts or SIB. Aggressive behaviors in middle school, states didn't know how to show affection or love at that time. No aggressive behaviors since then. Therapy since age 5, from [age] longest, on Zoloft, then Paxil for anxiety. From [age], -, numerous medication trials including Sapphris, Wellbutrin (weight gain), Klonopin, Xanax, Vyvanse, Adderall (weight loss), propranolol, Paxil, Zoloft. Has been treated for anxiety, depression, ADHD ( based off of restlessness, low concentration, energy, and trouble sleeping) and bipolar dx (based off "slut phase at age 20 - One prior provider suggested she may have bipolar disorder in the past. Patient denies s/s of prior manic episodes and states she doesn't think she has it.) No meds since age 25. Restarted therapy at beginning of pregnancy. Denies past psychiatric hospitalizations.  SUBS - alcohol once a year, none during pregnancy, h/o smoking age [age], pack a day, not smoking now.  MEDS- Zoloft 50mg daily since 2022 during pregnancy, prescribed by OBGYN. No side effects.  ALL- soy  FH - coiusin with anxiety/depression, no FH of suicide, biological father abused opiates.  SH- lives with , college - BS in [employer] science, identifies as Zoroastrianism  American, raised in [place], no developmental delays, public school, As & Bs, no learning disabilities noted, parents  when she was 3years old, moved around a lot with various family members, stepfather involved in life, close to him, limited interactions with her biological father, childhood neglect when with her father, mother was emotionally critical, close to her maternal extended family, close to cousin.  is most supportive relationship she's ever had. Losses - parental divorce, loss of childhood friend at age 9, loss of grandparent at age 19, grandmother last year from dementia. 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  RISK - Low acute risk of self harm. Protected by responsibilities to family, support network, in therapy, reasons for living, future-oriented ,motivated for treatment. Denies si/hi/ah/vh, future oriented, euthumic affect.  DDX - anxiety, , r/o bipolar d/o  PLAN - wants to transition care to [facility] treatment team  Patient gives verbal consent to speak with patient's . Nam Rodriguez [phone],  less than a year, known him for 7.5 years.  Anxiety is much more manageable.  pt admitted to clinic -    CLINIC COURSE: [facility] She began treatment with therapist. She has been taking sertraline 50mg qhs with good effect since Aug 2022 and would like to continue it going forward. RBA of medications discussed with patient. Describes anxiety as improving with brief, infrequent episodes of anxiety. Denies SI/HI/AH/VH. No evidence of aftab or psychosis.  Had baby on 22, baby boy. Jadiel. Traumatic delivery, had to do emergent , which she wasn't prepared for. Baby had to go NICU so delayed breast feeding. Feeling emotionally unstable. Crying often. Loves him, finds him to be really cute. Baby sleeps ok, takes turns with . Denies SI/HI/AH/VH. Denies thoughts of harming the baby.  feels that she gets anxious and cries often, but happy that they're doing a good job with the milk and taking care of the baby. Continuing therapy with Rebeca. Continues to take Zoloft 50mg daily, denies side effects. States she just got a refill and doesn't need rx today.Patient agreeable to increasing Zoloft to 75mg with plan to furhter increase if tolerated given hisotry of nausea.will f/u in 1 week. 23 - improving on on Zoloft 75mg. 1/10/23 zoloft increased to 100mg to better address low mood. Patient improving on zoloft 100mg d, no longer having thoughts of wanting to be dead. 23 - stable on Zoloft 100mg daily, doing well. Denies SI/HI/AH/VH. No evidence of psycohsis or aftab.  - stable, doing well, denies si/hi/ah/vh, no thoughts of harming baby, euthymic affect. Patient misses next appt and then does not return to see undersigned until 2023 stating that she had received refills from her OB and had continued to take meds throughout without side effects. She remains stable, denies si/hi/ah/vh, denies thoughts of harming baby, future-oriented. Baby is 4 mo old. Pt aware of undersigned's coming leave and expects to f/u with coverage."    23- pt with passive SI several times/wk, working with therapist, pt very insightful and able to manage, agrees we keep rx stable at this time as job stress may be contributing factor that is expected to resolve, not in MDE, no sx c/w elevated/mixed episode or psychosis, agrees we f/u in 4wks, she will make sooner appt if needed, schedules f/u  at 9:30am, has [facility] clinic contact info.  23- sx stable, some situational frustration with going back to work, pt not in mood episode or with uncontrolled sx, no evidence for acute risk, continue current plan, pt happy with plan  23- pt reports no significant changes, still has some depressive sx but controlled, keep Rx stable at this time, pt aware we can meet sooner if needed (transitioning therapists at [facility]), no SI/HI  23- sx stable, somewhat improved, pt seeking therapy, no med changes indicated, pt happy with plan  10/11/23- overall doing well, beginning therapy this week [facility], no indication for med change at this time or evidence for heightened risk  23- sx stable, pt with intermittent URIs, no interim suicidal thoughts, no issues with zoloft, f/u 1mo  23- pt at baseline, no SI, has unused zoloft refill, would like to further discuss attention sx in future, no gross impairment  1/3/24- pt ill with COVID but functioning, somatic mood sx likely 2/2 illness, no indication for rx change at this time.  Future oriented and hopeful.  -continue zoloft 100mg PO daily -weekly therapy -f/u 1mo

## 2024-01-12 ENCOUNTER — NON-APPOINTMENT (OUTPATIENT)
Age: 34
End: 2024-01-12

## 2024-01-12 ENCOUNTER — APPOINTMENT (OUTPATIENT)
Dept: PSYCHIATRY | Facility: CLINIC | Age: 34
End: 2024-01-12

## 2024-01-19 ENCOUNTER — APPOINTMENT (OUTPATIENT)
Dept: PSYCHIATRY | Facility: CLINIC | Age: 34
End: 2024-01-19

## 2024-01-22 NOTE — END OF VISIT
[Duration of Psychotherapy Visit (minutes spent in synchronous communication): ____] : Duration of Psychotherapy Visit (minutes spent in synchronous communication): [unfilled] [Individual Psychotherapy for 16-37 minutes] : Individual Psychotherapy for 16-37 minutes [Teletherapy Service Provided] : The services provided in this session were delivered via tele-therapy [FreeTextEntry3] : Home in [place], [place] [FreeTextEntry5] : Home in [place], [place]

## 2024-01-22 NOTE — REASON FOR VISIT
[Patient preference] : as per patient preference [Telehealth (audio & video) - Individual/Group] : This visit was provided via telehealth using real-time 2-way audio visual technology. [Other Location: e.g. Home (Enter Location, City,State)___] : The provider was located at [unfilled]. [Home] : The patient, [unfilled], was located at home, [unfilled], at the time of the visit. [Verbal consent obtained from patient/other participant(s)] : Verbal consent for telehealth/telephonic services obtained from patient/other participant(s) [Patient] : Patient [FreeTextEntry4] : 10:00am [FreeTextEntry1] : Individual therapy for patient's postpartum mental health [FreeTextEntry5] : 11:00am

## 2024-01-22 NOTE — PLAN
[Integrative Therapy] : Integrative Therapy  [Recommended Frequency of Visits: ____] : Recommended frequency of visits: [unfilled] [Return in ____ week(s)] : Return in [unfilled] week(s) [FreeTextEntry2] : Goal A. Reduce symptoms of postpartum anxiety and depression   Objective A. Pt will utilize coping skills and strategies to more effectively manage symptoms and familial relationships Goal B. Pt wants to make sure her son grows up with better relationships than she did  Objective B: Increase insight around interpersonal relationship dynamics   [de-identified] : Patient arrived to the zoom session on-time. Patient has been sick recently (potentially long-COVID) and so have her  and baby. Writer asked open-ended questions to understand what that's been like for her, and she said they all "have just been existing". Patient also reported better interactions with her in-laws now that she doesn't include them in her "monkey sphere". This idea of a monkey sphere brought up the patient's best friend, who is also her ex-boyfriend's brother. Writer continued to ask open-ended questions to understand their roles in her life. The patient recalled her early postpartum days when she was tormented by her ex saying that she would "be a bad mom", and her heightened anxieties of miscarriage during pregnancy. Patient said she feels in a much better place than she was at this time last year though. Patient ended session in good behavioral and emotional control. [FreeTextEntry1] : 1 x wk psychotherapy with Ophelia Patel

## 2024-01-25 ENCOUNTER — APPOINTMENT (OUTPATIENT)
Dept: PSYCHIATRY | Facility: CLINIC | Age: 34
End: 2024-01-25

## 2024-01-29 NOTE — PLAN
[FreeTextEntry2] : Goal A. Reduce symptoms of postpartum anxiety and depression   Objective A. Pt will utilize coping skills and strategies to more effectively manage symptoms and familial relationships Goal B. Pt wants to make sure her son grows up with better relationships than she did  Objective B: Increase insight around interpersonal relationship dynamics   [de-identified] : Patient arrived to the zoom session on-time. The patient's baby had an allergic reaction this week and had to be taken to the ER. Plus, the patient is now sick with a cold. Writer asked open-ended questions to understand what all this has been like for the patient. She said the incident with her baby was terrifying and they are just trying to make it through. Patient received photos of her baby at  during the session and in one the baby was crying. Writer asked open-ended questions to understand what the patient made of that. The patient said she thinks the baby misses her and she misses him too. Patient then expressed a realistic fantasy of taking a break from her job to take care of the baby amidst other household stresses (i.e. selling their apartment and moving). Patient admitted this has been a new idea (post ER visit) as she is wanting more time with the baby and wanting to prevent more sickness (baby picks up a lot from ). Patient said she is "running on fumes" at the moment but was going to nap, bake, and continue crocheting to take a break from thinking and take care of her self. Patient ended session in good behavioral and emotional control. [FreeTextEntry1] : 1 x wk psychotherapy with Ophelia Patel

## 2024-01-29 NOTE — REASON FOR VISIT
[FreeTextEntry4] : 11:00am [FreeTextEntry5] : 11:45am [FreeTextEntry1] : Individual therapy for patient's postpartum mental health

## 2024-01-31 ENCOUNTER — APPOINTMENT (OUTPATIENT)
Age: 34
End: 2024-01-31
Payer: COMMERCIAL

## 2024-01-31 PROCEDURE — 99213 OFFICE O/P EST LOW 20 MIN: CPT | Mod: 95

## 2024-01-31 NOTE — RISK ASSESSMENT
[No, patient denies ideation or behavior] : No, patient denies ideation or behavior [FreeTextEntry8] : Pt with sx well-controlled, has some WNL response to stressors and good coping skills, no evidence for heightened acuity at this time, no SI, pt engaged in treatment [Low acute suicide risk] : Low acute suicide risk [Yes] : Yes

## 2024-01-31 NOTE — PHYSICAL EXAM
[Well groomed] : well groomed [Average] : average [Cooperative] : cooperative [Euthymic] : euthymic [Full] : full [Clear] : clear [Linear/Goal Directed] : linear/goal directed [None] : none [None Reported] : none reported [Above average] : above average [WNL] : within normal limits [de-identified] : cheerful

## 2024-01-31 NOTE — DISCUSSION/SUMMARY
[FreeTextEntry1] : Per prior note:  "Clinical Summary: 32 year old cis-gendered, heterosexual Portuguese American female, lives with her  in [place], works as  at [employer], currently on leave 2' being 23 wks pregnant, due 2023, PPH of depression, anxiety, ADHD, h/o cutting in high school, in therapy since age 5 on and off, numerous medication trials, specifically Zoloft and paxil for anxiety, restarted on Zoloft 50mg at beginning of pregnancy for anxiety, denies substance abuse, h/o smoking,  Referred by OBGYN, presents for assessment and treatment of increased anxiety during pregnancy.  Pt found out she was pregnant in 2022. This is her first pregnancy, no children, no FH of  depression or anxiety, planned pregnancy, on ST disability secondary to morning sickness, happy to be growing a child. Plans to breastfeed. Unsure if she will cont to work after pregnancy. In-laws will help with childcare. Last gyn exam was 2 wks. LMP 3, FMP age 14.  She describes feelings of apathy, amotivation, catastrophizing, frequent crying spells, excessive worries, ruminations, restlessness, and guilt about fears of negatively impacting her pregnancy. She states anxiety comes daily but is not constant. No panic attacks, last PA was 4 years ago. Denies SI/HI/AH/VH. Denies h/o manic episodes, denies hypersexuality, increased spending, decreased need for sleep, impulsive behaviors. Presents with euthymic mood.  PPH - SIB (cutting) in high school, has written suicide letters and had pills ready when in high school. Since then no attempts, preparatory acts or SIB. Aggressive behaviors in middle school, states didn't know how to show affection or love at that time. No aggressive behaviors since then. Therapy since age 5, from [age] longest, on Zoloft, then Paxil for anxiety. From [age], -, numerous medication trials including Sapphris, Wellbutrin (weight gain), Klonopin, Xanax, Vyvanse, Adderall (weight loss), propranolol, Paxil, Zoloft. Has been treated for anxiety, depression, ADHD ( based off of restlessness, low concentration, energy, and trouble sleeping) and bipolar dx (based off "slut phase at age 20 - One prior provider suggested she may have bipolar disorder in the past. Patient denies s/s of prior manic episodes and states she doesn't think she has it.) No meds since age 25. Restarted therapy at beginning of pregnancy. Denies past psychiatric hospitalizations.  SUBS - alcohol once a year, none during pregnancy, h/o smoking age [age], pack a day, not smoking now.  MEDS- Zoloft 50mg daily since 2022 during pregnancy, prescribed by OBGYN. No side effects.  ALL- soy  FH - coiusin with anxiety/depression, no FH of suicide, biological father abused opiates.  SH- lives with , college - BS in qcue science, identifies as [place]  American, raised in [place], no developmental delays, public school, As & Bs, no learning disabilities noted, parents  when she was 3years old, moved around a lot with various family members, stepfather involved in life, close to him, limited interactions with her biological father, childhood neglect when with her father, mother was emotionally critical, close to her maternal extended family, close to cousin.  is most supportive relationship she's ever had. Losses - parental divorce, loss of childhood friend at age 9, loss of grandparent at age 19, grandmother last year from dementia. 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  RISK - Low acute risk of self harm. Protected by responsibilities to family, support network, in therapy, reasons for living, future-oriented ,motivated for treatment. Denies si/hi/ah/vh, future oriented, euthumic affect.  DDX - anxiety, , r/o bipolar d/o  PLAN - wants to transition care to [facility] treatment team  Patient gives verbal consent to speak with patient's . Nam Rodriguez [phone],  less than a year, known him for 7.5 years.  Anxiety is much more manageable.  pt admitted to clinic -    CLINIC COURSE: Patient [facility]. She began treatment with therapist. She has been taking sertraline 50mg qhs with good effect since Aug 2022 and would like to continue it going forward. RBA of medications discussed with patient. Describes anxiety as improving with brief, infrequent episodes of anxiety. Denies SI/HI/AH/VH. No evidence of aftab or psychosis.  Had baby on 22, baby boy. Jadiel. Traumatic delivery, had to do emergent , which she wasn't prepared for. Baby had to go NICU so delayed breast feeding. Feeling emotionally unstable. Crying often. Loves him, finds him to be really cute. Baby sleeps ok, takes turns with . Denies SI/HI/AH/VH. Denies thoughts of harming the baby.  feels that she gets anxious and cries often, but happy that they're doing a good job with the milk and taking care of the baby. Continuing therapy with Rebeca. Continues to take Zoloft 50mg daily, denies side effects. States she just got a refill and doesn't need rx today.Patient agreeable to increasing Zoloft to 75mg with plan to furhter increase if tolerated given hisotry of nausea.will f/u in 1 week. 23 - improving on on Zoloft 75mg. 1/10/23 zoloft increased to 100mg to better address low mood. Patient improving on zoloft 100mg d, no longer having thoughts of wanting to be dead. 23 - stable on Zoloft 100mg daily, doing well. Denies SI/HI/AH/VH. No evidence of psycohsis or aftab.  - stable, doing well, denies si/hi/ah/vh, no thoughts of harming baby, euthymic affect. Patient misses next appt and then does not return to see undersigned until 2023 stating that she had received refills from her OB and had continued to take meds throughout without side effects. She remains stable, denies si/hi/ah/vh, denies thoughts of harming baby, future-oriented. Baby is 4 mo old. Pt aware of undersigned's coming leave and expects to f/u with coverage."    23- pt with passive SI several times/wk, working with therapist, pt very insightful and able to manage, agrees we keep rx stable at this time as job stress may be contributing factor that is expected to resolve, not in MDE, no sx c/w elevated/mixed episode or psychosis, agrees we f/u in 4wks, she will make sooner appt if needed, schedules f/u  at 9:30am, has [facility] clinic contact info.  23- sx stable, some situational frustration with going back to work, pt not in mood episode or with uncontrolled sx, no evidence for acute risk, continue current plan, pt happy with plan  23- pt reports no significant changes, still has some depressive sx but controlled, keep Rx stable at this time, pt aware we can meet sooner if needed (transitioning therapists at [facility]), no SI/HI  23- sx stable, somewhat improved, pt seeking therapy, no med changes indicated, pt happy with plan  10/11/23- overall doing well, beginning therapy this week [facility], no indication for med change at this time or evidence for heightened risk  23- sx stable, pt with intermittent URIs, no interim suicidal thoughts, no issues with zoloft, f/u 1mo  23- pt at baseline, no SI, has unused zoloft refill, would like to further discuss attention sx in future, no gross impairment  1/3/24- pt ill with COVID but functioning, somatic mood sx likely 2/2 illness, no indication for rx change at this time. Future oriented and hopeful.  24- pt stable, therapy going well, no indication for rx changes at this time.  -continue zoloft 100mg PO daily  -weekly therapy -f/u 1mo, pt will call [facility] to schedule with Dr Giron

## 2024-01-31 NOTE — HISTORY OF PRESENT ILLNESS
[FreeTextEntry1] : Covering for Dr Giron  Chart reviewed  34yo F, hx depression, anxiety, ADHD, hx NSSI and suicidal preparations in high school, none since, has infant baby at home, presents for med management f/u.    Pt cheerful, knitting.  Pt feeling overall well, recovered from COVID, mood stable though notices irritability and mildly overwhelmed with stressors, eg illness in family, starting homebuying process.  Relationships stable.  Connected with friends and family.  Good coping skills.  Pt sleeping well, good energy, no interim suicidal or catastrophic thoughts.  No uncontrolled worry.  No new/worsening sx otherwise.  Pt describes work going well though and finding structure and goals to be helpful.  No gross inattention.  No AE of Rx, seeks to continue.  Therapy productive.  Discussed transition back to Dr Giron next visit.

## 2024-02-01 ENCOUNTER — APPOINTMENT (OUTPATIENT)
Dept: PSYCHIATRY | Facility: CLINIC | Age: 34
End: 2024-02-01

## 2024-02-01 NOTE — REASON FOR VISIT
[Patient preference] : as per patient preference [Telehealth (audio & video) - Individual/Group] : This visit was provided via telehealth using real-time 2-way audio visual technology. [Other Location: e.g. Home (Enter Location, City,State)___] : The provider was located at [unfilled]. [Home] : The patient, [unfilled], was located at home, [unfilled], at the time of the visit. [Verbal consent obtained from patient/other participant(s)] : Verbal consent for telehealth/telephonic services obtained from patient/other participant(s) [Patient] : Patient [FreeTextEntry4] : 11:00am [FreeTextEntry5] : 11:45am [FreeTextEntry1] : Individual therapy for patient's postpartum mental health

## 2024-02-01 NOTE — END OF VISIT
[Duration of Psychotherapy Visit (minutes spent in synchronous communication): ____] : Duration of Psychotherapy Visit (minutes spent in synchronous communication): [unfilled] [Individual Psychotherapy for 38-52 minutes] : Individual Psychotherapy for 38 - 52 minutes [Teletherapy Service Provided] : The services provided in this session were delivered via tele-therapy [FreeTextEntry3] : Home in [place], [place] [FreeTextEntry5] : Home in [place], [place]

## 2024-02-01 NOTE — PLAN
[Integrative Therapy] : Integrative Therapy  [Recommended Frequency of Visits: ____] : Recommended frequency of visits: [unfilled] [Return in ____ week(s)] : Return in [unfilled] week(s) [FreeTextEntry2] : Goal A. Reduce symptoms of postpartum anxiety and depression   Objective A. Pt will utilize coping skills and strategies to more effectively manage symptoms and familial relationships Goal B. Pt wants to make sure her son grows up with better relationships than she did  Objective B: Increase insight around interpersonal relationship dynamics   [de-identified] : Patient arrived to the zoom session on-time. Patient was grateful that for the first time in a while, no one in their household is sick. Nonetheless, in absence of physical ailments, the patient reports an increase in overthinking especially around themes we've spoken about previously including guilt, her baby's health, and "food issues". Writer used open-ended questioning to better understand the patient's feelings about this. Patient expressed feeling extra responsibility as his mother. Writer and patient explored her own relationship with food and the influence of various family members' relationship with food on her and her baby presently. Writer continued to ask open-ended questions about the patients values of family and food. Patient ended session in good behavioral and emotional control. [FreeTextEntry1] : 1 x wk psychotherapy with Ophelia Patel

## 2024-02-07 ENCOUNTER — APPOINTMENT (OUTPATIENT)
Dept: PSYCHIATRY | Facility: CLINIC | Age: 34
End: 2024-02-07

## 2024-02-08 DIAGNOSIS — F41.9 ANXIETY DISORDER, UNSPECIFIED: ICD-10-CM

## 2024-02-09 NOTE — END OF VISIT
[Duration of Psychotherapy Visit (minutes spent in synchronous communication): ____] : Duration of Psychotherapy Visit (minutes spent in synchronous communication): [unfilled] [Individual Psychotherapy for 38-52 minutes] : Individual Psychotherapy for 38 - 52 minutes [Teletherapy Service Provided] : The services provided in this session were delivered via tele-therapy [FreeTextEntry3] : Home in [place], [place] [FreeTextEntry5] : Home in [place], [place]

## 2024-02-09 NOTE — PLAN
[Integrative Therapy] : Integrative Therapy  [Recommended Frequency of Visits: ____] : Recommended frequency of visits: [unfilled] [Return in ____ week(s)] : Return in [unfilled] week(s) [FreeTextEntry2] : Goal A. Reduce symptoms of postpartum anxiety and depression   Objective A. Pt will utilize coping skills and strategies to more effectively manage symptoms and familial relationships Goal B. Pt wants to make sure her son grows up with better relationships than she did  Objective B: Increase insight around interpersonal relationship dynamics   [de-identified] : Patient arrived to the zoom session on-time. Patient discussed her various hobbies- past and present- and the ways in which they help her cope. Writer asked open-ended questions to understand how these hobbies are connected to her upbringing and the feelings they evoke now. Patient also spoke about their impending move and the financial considerations that has brought up for her. Writer continued to ask open-ended questions about her feelings related to money and encouraged the patient to use this space to explore some of the more difficult emotions arising. Patient ended session in good behavioral and emotional control. [FreeTextEntry1] : 1 x wk psychotherapy with Ophelia Patel

## 2024-02-09 NOTE — REASON FOR VISIT
[Patient preference] : as per patient preference [Telehealth (audio & video) - Individual/Group] : This visit was provided via telehealth using real-time 2-way audio visual technology. [Other Location: e.g. Home (Enter Location, City,State)___] : The provider was located at [unfilled]. [Home] : The patient, [unfilled], was located at home, [unfilled], at the time of the visit. [Verbal consent obtained from patient/other participant(s)] : Verbal consent for telehealth/telephonic services obtained from patient/other participant(s) [Patient] : Patient [FreeTextEntry4] : 11:30am [FreeTextEntry5] : 12:15am [FreeTextEntry1] : Individual therapy for patient's postpartum mental health

## 2024-02-14 ENCOUNTER — APPOINTMENT (OUTPATIENT)
Dept: PSYCHIATRY | Facility: CLINIC | Age: 34
End: 2024-02-14

## 2024-02-16 NOTE — END OF VISIT
[Duration of Psychotherapy Visit (minutes spent in synchronous communication): ____] : Duration of Psychotherapy Visit (minutes spent in synchronous communication): [unfilled] [Individual Psychotherapy for 38-52 minutes] : Individual Psychotherapy for 38 - 52 minutes [Teletherapy Service Provided] : The services provided in this session were delivered via tele-therapy [FreeTextEntry3] : Home in [place], [place] [FreeTextEntry5] : Home in [place], [place] [FreeTextEntry1] : [employer] Ophelia Patel

## 2024-02-16 NOTE — PLAN
[Integrative Therapy] : Integrative Therapy  [Recommended Frequency of Visits: ____] : Recommended frequency of visits: [unfilled] [Return in ____ week(s)] : Return in [unfilled] week(s) [FreeTextEntry2] : Goal A. Reduce symptoms of postpartum anxiety and depression   Objective A. Pt will utilize coping skills and strategies to more effectively manage symptoms and familial relationships Goal B. Pt wants to make sure her son grows up with better relationships than she did  Objective B: Increase insight around interpersonal relationship dynamics   [de-identified] : Patient arrived to the zoom session on-time. Patient reported feeling stressed due to everything coming up in relation to buying a home. Writer validated her feelings and asked open-ended questions about what feelings were coming up for her. Patient discussed increased interpersonal conflicts and feelings of disrespect due to her personal values/ideas around family and money. Writer continued to validate everything that was coming up for her, especially in the context of a big life decision/transition. Patient ended session in good behavioral and emotional control. [FreeTextEntry1] : 1 x wk psychotherapy with Ophelia Patel

## 2024-02-23 ENCOUNTER — APPOINTMENT (OUTPATIENT)
Dept: PSYCHIATRY | Facility: CLINIC | Age: 34
End: 2024-02-23

## 2024-02-23 NOTE — REASON FOR VISIT
[Patient preference] : as per patient preference [Telehealth (audio & video) - Individual/Group] : This visit was provided via telehealth using real-time 2-way audio visual technology. [Other Location: e.g. Home (Enter Location, City,State)___] : The provider was located at [unfilled]. [Verbal consent obtained from patient/other participant(s)] : Verbal consent for telehealth/telephonic services obtained from patient/other participant(s) [Home] : The patient, [unfilled], was located at home, [unfilled], at the time of the visit. [Patient] : Patient [FreeTextEntry5] : 10:45am [FreeTextEntry4] : 10:00am [FreeTextEntry1] : Individual therapy for patient's postpartum mental health

## 2024-02-23 NOTE — END OF VISIT
[Individual Psychotherapy for 38-52 minutes] : Individual Psychotherapy for 38 - 52 minutes [Duration of Psychotherapy Visit (minutes spent in synchronous communication): ____] : Duration of Psychotherapy Visit (minutes spent in synchronous communication): [unfilled] [Teletherapy Service Provided] : The services provided in this session were delivered via tele-therapy [FreeTextEntry3] : Home in [place], [place] [FreeTextEntry5] : Home in [place], [place] [FreeTextEntry1] : [employer] Ophelia Patel

## 2024-02-23 NOTE — PLAN
[Integrative Therapy] : Integrative Therapy  [Return in ____ week(s)] : Return in [unfilled] week(s) [Recommended Frequency of Visits: ____] : Recommended frequency of visits: [unfilled] [FreeTextEntry2] : Goal A. Reduce symptoms of postpartum anxiety and depression   Objective A. Pt will utilize coping skills and strategies to more effectively manage symptoms and familial relationships Goal B. Pt wants to make sure her son grows up with better relationships than she did  Objective B: Increase insight around interpersonal relationship dynamics   [de-identified] : Patient arrived to the zoom session on-time. Patient reported feeling both "good" and "stressed" due to the home offer going through. Writer asked open-ended questions to understand these feelings more. Patient is taking enjoyment in thinking, researching, and planning details for the new home but also dreads the interpersonal conflict/feelings of dismissal in these matters. Writer validated her feelings and continued to asked open-ended questions about what these feelings remind her of. Patient discussed her upbringing in lot more detail. Writer encouraged her to use this space to express the whole range of emotions being evoked. Patient ended session in good behavioral and emotional control. [FreeTextEntry1] : 1 x wk psychotherapy with Ophelia Patel

## 2024-03-01 ENCOUNTER — NON-APPOINTMENT (OUTPATIENT)
Age: 34
End: 2024-03-01

## 2024-03-01 ENCOUNTER — APPOINTMENT (OUTPATIENT)
Dept: PSYCHIATRY | Facility: CLINIC | Age: 34
End: 2024-03-01

## 2024-03-01 NOTE — REASON FOR VISIT
[Patient preference] : as per patient preference [Telehealth (audio & video) - Individual/Group] : This visit was provided via telehealth using real-time 2-way audio visual technology. [Other Location: e.g. Home (Enter Location, City,State)___] : The provider was located at [unfilled]. [Verbal consent obtained from patient/other participant(s)] : Verbal consent for telehealth/telephonic services obtained from patient/other participant(s) [Home] : The patient, [unfilled], was located at home, [unfilled], at the time of the visit. [Patient] : Patient [FreeTextEntry4] : 10:00am [FreeTextEntry5] : 10:45am [FreeTextEntry1] : Individual therapy for patient's postpartum mental health

## 2024-03-01 NOTE — PLAN
[Integrative Therapy] : Integrative Therapy  [Recommended Frequency of Visits: ____] : Recommended frequency of visits: [unfilled] [Return in ____ week(s)] : Return in [unfilled] week(s) [FreeTextEntry2] : Goal 1: Decrease interpersonal conflict so that her baby will grow up in a more solid family-unit  Objective A: Strategies for managing difficult emotions  Objective B: Strategies for enhanced communication  Goal 2: Decrease guilt and anxiety that comes with raising a baby Objective A: Explore how patient's upbringing is influencing her presently  Objective B: Increase insight around feelings  Objective C: Experiential processing of positive and difficult emotions   [de-identified] : Patient arrived to the zoom session on-time. Patient and writer collaborated on treatment planning. With limited remaining time, the patient discussed how she has been coping with moving stress. Writer asked open-ended questions about how it's been affecting her interpersonal relationships. Patient ended session in good behavioral and emotional control. [FreeTextEntry1] : 1 x wk psychotherapy with Ophelia Patel

## 2024-03-07 ENCOUNTER — APPOINTMENT (OUTPATIENT)
Dept: PSYCHIATRY | Facility: CLINIC | Age: 34
End: 2024-03-07

## 2024-03-07 NOTE — END OF VISIT
[Duration of Psychotherapy Visit (minutes spent in synchronous communication): ____] : Duration of Psychotherapy Visit (minutes spent in synchronous communication): [unfilled] [Individual Psychotherapy for 38-52 minutes] : Individual Psychotherapy for 38 - 52 minutes [Teletherapy Service Provided] : The services provided in this session were delivered via tele-therapy [FreeTextEntry5] : Home in [place], [place] [FreeTextEntry3] : Home in [place], [place] [FreeTextEntry1] : [employer] Ophelia Patel

## 2024-03-07 NOTE — PLAN
[Integrative Therapy] : Integrative Therapy  [Psychodynamic Therapy] : Psychodynamic Therapy  [Recommended Frequency of Visits: ____] : Recommended frequency of visits: [unfilled] [de-identified] : Patient arrived to the zoom session on-time. Patient and writer collaborated on transition and discharge criteria for the treatment plan. This led into a discussion around patient's goals and patient contextualized these goals based on recent interpersonal conflicts. Writer asked open-ended questions to explore her feelings and management of these events. Writer continued to ask open-ended questions to explore patient's reactions and defense/coping mechanisms. Patient ended session in good behavioral and emotional control. [FreeTextEntry2] : Goal 1: Manage interpersonal conflict so that her baby will grow up in a more solid family-unit  Objective A: Strategies for managing difficult emotions  Objective B: Strategies for enhanced communication  Goal 2: Decrease guilt and anxiety that comes with raising a baby Objective A: Explore how patient's upbringing is influencing her presently  Objective B: Increase insight around feelings and defenses Objective C: Experiential processing of positive and difficult emotions   [Return in ____ week(s)] : Return in [unfilled] week(s) [FreeTextEntry1] : 1 x wk psychotherapy with Ophelia Patel

## 2024-03-07 NOTE — REASON FOR VISIT
[Patient preference] : as per patient preference [Telehealth (audio & video) - Individual/Group] : This visit was provided via telehealth using real-time 2-way audio visual technology. [Other Location: e.g. Home (Enter Location, City,State)___] : The provider was located at [unfilled]. [Home] : The patient, [unfilled], was located at home, [unfilled], at the time of the visit. [Verbal consent obtained from patient/other participant(s)] : Verbal consent for telehealth/telephonic services obtained from patient/other participant(s) [Patient] : Patient [FreeTextEntry5] : 11:45am [FreeTextEntry4] : 11:00am [FreeTextEntry1] : Individual therapy for patient's postpartum mental health

## 2024-03-11 ENCOUNTER — APPOINTMENT (OUTPATIENT)
Dept: PSYCHIATRY | Facility: CLINIC | Age: 34
End: 2024-03-11

## 2024-03-11 NOTE — RISK ASSESSMENT
[No, patient denies ideation or behavior] : No, patient denies ideation or behavior [Low acute suicide risk] : Low acute suicide risk [No] : No [Not clinically indicated] : Safety Plan completed/updated (for individuals at risk): Not clinically indicated [FreeTextEntry8] : Patient denies suicidal ideations.

## 2024-03-11 NOTE — DISCUSSION/SUMMARY
[Initial Plan] : Initial Plan [Able to manage surrounding demands and opportunities] : able to manage surrounding demands and opportunities [Able to exercise self-direction] : able to exercise self-direction [Able to set and pursue goals] : able to set and pursue goals [Adherent to treatment recommendations] : adherent to treatment recommendations [Articulate] : articulate [Attempting to realize their potential] : Attempting to realize their potential [Cognitively intact] : cognitively intact [Insightful] : insightful [Creative] : creative [Motivated to participate in treatment] : motivated to participate in treatment [Intelligent] : intelligent [Motivated and ready for change] : motivated and ready for change [Health literacy] : health literacy [Motivated to maintain or improve physical health] : motivated to maintain or improve physical health [Financially stable] : financially stable [In good health] : in good health [Has vocational interests or hobbies] : has vocational interests or hobbies [Part of a supportive marriage] : part of a supportive marriage [Part of a supportive family] : part of a supportive family [Housing stability] : housing stability [Steady employment] : steady employment [High level of education] : high level of education [English fluency] : English fluency [Connected to healthcare] : connected to healthcare [Access to safe outdoor spaces] : access to safe outdoor spaces [Social supports] : social supports [Interpersonal Relationships] : Interpersonal Relationships [Mental Health] : Mental Health [___ times a week] : [unfilled] times a week [Date of Last Physical Exam: _____] : Date of Last Physical Exam: [unfilled] [Tobacco Screening Completed?] : Tobacco Screening Completed: Yes [Continued - Progress made] : Continued - Progress made: [Initial] : Initial [Yes] : Yes [Supervisor (if needed)] : Supervisor [FreeTextEntry2] : 9/1/2024 [FreeTextEntry3] : 5/12/2022 [Other: ___] : [unfilled] [FreeTextEntry5] : Patient and writer have been working towards these goals by exploring patient's own upbringing and by bringing insight to her defenses  [de-identified] : As needed  [FreeTextEntry1] : Dealing with anxiety dealing related to raising a human [FreeTextEntry4] : "Reducing the guilt and anxiety related to raising a child"  [de-identified] : Unpack childhood/generational trauma  [de-identified] : Evidenced by generative insight and experiential emotional processing  [de-identified] : Strategies to reduce cognitive distortions  [de-identified] : The ability to tolerate conflictual opinions  [de-identified] : Evidenced by  out her feelings and thoughts from fact [de-identified] : A significant decrease in postpartum depression and anxiety  [de-identified] : The ability to experience and communicate a range of emotions in a healthy manner  [de-identified] : Patient moving to [place], therapist ending internship  [de-identified] : Reviewed by Teresa Zendejas LCSW

## 2024-03-11 NOTE — PHYSICAL EXAM
[0 - 1 or 2] : 0 - 1 or 2 [1 - Monthly or less] : 1 - Monthly or less [0 - Never] : 0 - Never [2 - Yes, but not in the last year] : 2 - Yes, but not in the last year [0 - No] : 0 - No [4] : 4 [6] : 6 [2] : 2 [3] : 3 [Individual reports tobacco use during the last 30 days?] : Individual reports tobacco use during the last 30 days? No [] : No [FreeTextEntry1] : 3 [SchwartzScore] : 43

## 2024-03-18 ENCOUNTER — APPOINTMENT (OUTPATIENT)
Dept: PSYCHIATRY | Facility: CLINIC | Age: 34
End: 2024-03-18
Payer: COMMERCIAL

## 2024-03-18 PROCEDURE — 99215 OFFICE O/P EST HI 40 MIN: CPT | Mod: 95

## 2024-03-18 NOTE — REASON FOR VISIT
[Patient preference] : as per patient preference [Telehealth (audio & video) - Individual/Group] : This visit was provided via telehealth using real-time 2-way audio visual technology. [Other Location: e.g. Home (Enter Location, City,State)___] : The provider was located at [unfilled]. [Home] : The patient, [unfilled], was located at home, [unfilled], at the time of the visit. [Patient] : Patient [Verbal consent obtained from patient/other participant(s)] : Verbal consent for telehealth/telephonic services obtained from patient/other participant(s)

## 2024-03-18 NOTE — RISK ASSESSMENT
[No, patient denies ideation or behavior] : No, patient denies ideation or behavior [FreeTextEntry8] : Pt with sx well-controlled, has some WNL response to stressors and good coping skills, no evidence for heightened acuity at this time, no SI, pt engaged in treatment [FreeTextEntry9] : low acute risk of harm to self/others at this time. [Low acute suicide risk] : Low acute suicide risk [Yes] : Yes

## 2024-03-18 NOTE — PHYSICAL EXAM
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

## 2024-03-18 NOTE — DISCUSSION/SUMMARY
[FreeTextEntry1] : Per prior note:  "Clinical Summary: 32 year old cis-gendered, heterosexual [place]ian American female, lives with her  in [place], works as  at [employer], currently on leave 2' being 23 wks pregnant, due 2023, PPH of depression, anxiety, ADHD, h/o cutting in high school, in therapy since age 5 on and off, numerous medication trials, specifically Zoloft and paxil for anxiety, restarted on Zoloft 50mg at beginning of pregnancy for anxiety, denies substance abuse, h/o smoking,  Referred by OBGYN, presents for assessment and treatment of increased anxiety during pregnancy.  Pt found out she was pregnant in 2022. This is her first pregnancy, no children, no FH of  depression or anxiety, planned pregnancy, on ST disability secondary to morning sickness, happy to be growing a child. Plans to breastfeed. Unsure if she will cont to work after pregnancy. In-laws will help with childcare. Last gyn exam was 2 wks. LMP 3, FMP age 14.  She describes feelings of apathy, amotivation, catastrophizing, frequent crying spells, excessive worries, ruminations, restlessness, and guilt about fears of negatively impacting her pregnancy. She states anxiety comes daily but is not constant. No panic attacks, last PA was 4 years ago. Denies SI/HI/AH/VH. Denies h/o manic episodes, denies hypersexuality, increased spending, decreased need for sleep, impulsive behaviors. Presents with euthymic mood.  PPH - SIB (cutting) in high school, has written suicide letters and had pills ready when in high school. Since then no attempts, preparatory acts or SIB. Aggressive behaviors in middle school, states didn't know how to show affection or love at that time. No aggressive behaviors since then. Therapy since age 5, from [age] longest, on Zoloft, then Paxil for anxiety. From [age], -, numerous medication trials including Sapphris, Wellbutrin (weight gain), Klonopin, Xanax, Vyvanse, Adderall (weight loss), propranolol, Paxil, Zoloft. Has been treated for anxiety, depression, ADHD ( based off of restlessness, low concentration, energy, and trouble sleeping) and bipolar dx (based off "slut phase at age 20 - One prior provider suggested she may have bipolar disorder in the past. Patient denies s/s of prior manic episodes and states she doesn't think she has it.) No meds since age 25. Restarted therapy at beginning of pregnancy. Denies past psychiatric hospitalizations.  SUBS - alcohol once a year, none during pregnancy, h/o smoking age [age], pack a day, not smoking now.  MEDS- Zoloft 50mg daily since 2022 during pregnancy, prescribed by OBGYN. No side effects.  ALL- soy  FH - coiusin with anxiety/depression, no FH of suicide, biological father abused opiates.  SH- lives with , college - BS in [employer], identifies as Scientologist  American, raised in [place], no developmental delays, public school, As & Bs, no learning disabilities noted, parents  when she was 3years old, moved around a lot with various family members, stepfather involved in life, close to him, limited interactions with her biological father, childhood neglect when with her father, mother was emotionally critical, close to her maternal extended family, close to cousin.  is most supportive relationship she's ever had. Losses - parental divorce, loss of childhood friend at age 9, loss of grandparent at age 19, grandmother last year from dementia. 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  RISK - Low acute risk of self harm. Protected by responsibilities to family, support network, in therapy, reasons for living, future-oriented ,motivated for treatment. Denies si/hi/ah/vh, future oriented, euthumic affect.  DDX - anxiety, , r/o bipolar d/o  PLAN - wants to transition care to [facility] treatment team  Patient gives verbal consent to speak with patient's . Nam Rodriguez [phone],  less than a year, known him for 7.5 years.  Anxiety is much more manageable.  pt admitted to clinic -    CLINIC COURSE: [facility]. She began treatment with therapist. She has been taking sertraline 50mg qhs with good effect since Aug 2022 and would like to continue it going forward. RBA of medications discussed with patient. Describes anxiety as improving with brief, infrequent episodes of anxiety. Denies SI/HI/AH/VH. No evidence of aftab or psychosis.  Had baby on 22, baby boy. Jadiel. Traumatic delivery, had to do emergent , which she wasn't prepared for. Baby had to go NICU so delayed breast feeding. Feeling emotionally unstable. Crying often. Loves him, finds him to be really cute. Baby sleeps ok, takes turns with . Denies SI/HI/AH/VH. Denies thoughts of harming the baby.  feels that she gets anxious and cries often, but happy that they're doing a good job with the milk and taking care of the baby. Continuing therapy with Rebeca. Continues to take Zoloft 50mg daily, denies side effects. States she just got a refill and doesn't need rx today.Patient agreeable to increasing Zoloft to 75mg with plan to furhter increase if tolerated given hisotry of nausea.will f/u in 1 week. 23 - improving on on Zoloft 75mg. 1/10/23 zoloft increased to 100mg to better address low mood. Patient improving on zoloft 100mg d, no longer having thoughts of wanting to be dead. 23 - stable on Zoloft 100mg daily, doing well. Denies SI/HI/AH/VH. No evidence of psycohsis or aftab.  - stable, doing well, denies si/hi/ah/vh, no thoughts of harming baby, euthymic affect. Patient misses next appt and then does not return to see undersigned until 2023 stating that she had received refills from her OB and had continued to take meds throughout without side effects. She remains stable, denies si/hi/ah/vh, denies thoughts of harming baby, future-oriented. Baby is 4 mo old. Pt aware of undersigned's coming leave and expects to f/u with coverage."    23- pt with passive SI several times/wk, working with therapist, pt very insightful and able to manage, agrees we keep rx stable at this time as job stress may be contributing factor that is expected to resolve, not in MDE, no sx c/w elevated/mixed episode or psychosis, agrees we f/u in 4wks, she will make sooner appt if needed, schedules f/u  at 9:30am, has [facility] clinic contact info.  23- sx stable, some situational frustration with going back to work, pt not in mood episode or with uncontrolled sx, no evidence for acute risk, continue current plan, pt happy with plan  23- pt reports no significant changes, still has some depressive sx but controlled, keep Rx stable at this time, pt aware we can meet sooner if needed (transitioning therapists at [facility]), no SI/HI  23- sx stable, somewhat improved, pt seeking therapy, no med changes indicated, pt happy with plan  10/11/23- overall doing well, beginning therapy this week [facility], no indication for med change at this time or evidence for heightened risk  23- sx stable, pt with intermittent URIs, no interim suicidal thoughts, no issues with zoloft, f/u 1mo  23- pt at baseline, no SI, has unused zoloft refill, would like to further discuss attention sx in future, no gross impairment  1/3/24- pt ill with COVID but functioning, somatic mood sx likely 2/2 illness, no indication for rx change at this time. Future oriented and hopeful.  24- pt stable, therapy going well, no indication for rx changes at this time.  3/18/2024 - transitioned care back to [facility]  pt doing well, stable mood, compliant and tolerating sertraline 100mg daily.  excited about new house and coming move to [place] in early may.  planning to transition care to [place] providers. will notify therapist as well  -supportive therapy provided -continue zoloft 100mg PO daily  -weekly therapy -rtc 1mo -pt to call clinic if issues arise or go to nearest emergency room/911 in case of emergency

## 2024-03-18 NOTE — HISTORY OF PRESENT ILLNESS
[FreeTextEntry1] : Patient seen and assessed.  Chart reviewed.  Pt states that they are in the process of buying a house in [place], stressful but exciting.  Her 15mo old son is doing great, started walking, found out he's allergic to sesame, which was stressful.  Many of her initial concerns at onset of treatment have resolved.  Anxiety has returned to prenatal levels, generally doing well.  Plans to talk to her therapist to transition treatment to [place] at next visit.  Generally in a happy, stable mood.  Stable relationships with her , family, and friends.  Sleep difficulties come/go, was taking benadryl (unisom during pregnancy) to help her sleep, now considering unisom again.  Appetite is good, no recent weight changes. no gross attentional difficulties.  Remote work continues to go well.  Compliant with sertraline 100mg daily, no side effects.  Denies Si/HI/AH/VH/paranoia.  Therapy going well. rtc 4/15 1p

## 2024-03-20 ENCOUNTER — APPOINTMENT (OUTPATIENT)
Dept: PSYCHIATRY | Facility: CLINIC | Age: 34
End: 2024-03-20

## 2024-03-21 NOTE — END OF VISIT
[Duration of Psychotherapy Visit (minutes spent in synchronous communication): ____] : Duration of Psychotherapy Visit (minutes spent in synchronous communication): [unfilled] [Individual Psychotherapy for 38-52 minutes] : Individual Psychotherapy for 38 - 52 minutes [Teletherapy Service Provided] : The services provided in this session were delivered via tele-therapy [FreeTextEntry5] : Home in [place], [place] [FreeTextEntry3] : Home in [place], [place] [FreeTextEntry1] : [employer] Ophelia Patel

## 2024-03-21 NOTE — REASON FOR VISIT
[Patient preference] : as per patient preference [Telehealth (audio & video) - Individual/Group] : This visit was provided via telehealth using real-time 2-way audio visual technology. [Other Location: e.g. Home (Enter Location, City,State)___] : The provider was located at [unfilled]. [Home] : The patient, [unfilled], was located at home, [unfilled], at the time of the visit. [Verbal consent obtained from patient/other participant(s)] : Verbal consent for telehealth/telephonic services obtained from patient/other participant(s) [Patient] : Patient [FreeTextEntry5] : 12:20am [FreeTextEntry4] : 11:30am [FreeTextEntry1] : Individual therapy for patient's mental health

## 2024-03-21 NOTE — PLAN
[Integrative Therapy] : Integrative Therapy  [Psychodynamic Therapy] : Psychodynamic Therapy  [Recommended Frequency of Visits: ____] : Recommended frequency of visits: [unfilled] [Return in ____ week(s)] : Return in [unfilled] week(s) [FreeTextEntry2] : Goal 1: Manage interpersonal conflict so that her baby will grow up in a more solid family-unit  Objective A: Strategies for managing difficult emotions  Objective B: Strategies for enhanced communication  Goal 2: Decrease guilt and anxiety that comes with raising a baby Objective A: Explore how patient's upbringing is influencing her presently  Objective B: Increase insight around feelings and defenses Objective C: Experiential processing of positive and difficult emotions   [de-identified] : Patient arrived to the zoom session on-time. Patient and writer discussed termination/discharge more concretely and how patient would like to spend the remainder of our sessions together. Patient said she would like to use the sessions for "business as usual"-- as typical psychotherapy sessions. Writer asked open-ended questions regarding the patient's last couple of weeks. Patient reported remorse for not remembering the early experiences with her baby. Writer continued to ask open-ended questions to explore this further. Patient and writer worked together to continue processing her traumatic birth experience. Patient ended session in good behavioral and emotional control. [FreeTextEntry1] : 1 x wk psychotherapy with Ophelia Patel

## 2024-03-28 ENCOUNTER — APPOINTMENT (OUTPATIENT)
Dept: PSYCHIATRY | Facility: CLINIC | Age: 34
End: 2024-03-28

## 2024-03-29 NOTE — END OF VISIT
[Duration of Psychotherapy Visit (minutes spent in synchronous communication): ____] : Duration of Psychotherapy Visit (minutes spent in synchronous communication): [unfilled] [Individual Psychotherapy for 38-52 minutes] : Individual Psychotherapy for 38 - 52 minutes [Teletherapy Service Provided] : The services provided in this session were delivered via tele-therapy [FreeTextEntry3] : Home in [place], [place] [FreeTextEntry5] : Home in [place], [place] [FreeTextEntry1] : [employer] Ophelia Patel

## 2024-03-29 NOTE — PLAN
[Integrative Therapy] : Integrative Therapy  [Psychodynamic Therapy] : Psychodynamic Therapy  [Recommended Frequency of Visits: ____] : Recommended frequency of visits: [unfilled] [Return in ____ week(s)] : Return in [unfilled] week(s) [FreeTextEntry2] : Goal 1: Manage interpersonal conflict so that her baby will grow up in a more solid family-unit  Objective A: Strategies for managing difficult emotions  Objective B: Strategies for enhanced communication  Goal 2: Decrease guilt and anxiety that comes with raising a baby Objective A: Explore how patient's upbringing is influencing her presently  Objective B: Increase insight around feelings and defenses Objective C: Experiential processing of positive and difficult emotions   [de-identified] : Patient arrived to the zoom session on-time. Patient discussed her upcoming move/house purchase and how it's affecting her interpersonal relationships. Writer asked open-ended questions to explore her feelings about these dynamics. Patient expressed feeling exhausted and how she's been utilizing her coping mechanisms. Writer then asked open-ended questions about how the patient is feeling about termination. Patient said she's used to it because she hasn't ever had a long-term therapist. Patient ended session in good behavioral and emotional control. [FreeTextEntry1] : 1 x wk psychotherapy with Ophelia Patel

## 2024-03-29 NOTE — REASON FOR VISIT
[Patient preference] : as per patient preference [Telehealth (audio & video) - Individual/Group] : This visit was provided via telehealth using real-time 2-way audio visual technology. [Other Location: e.g. Home (Enter Location, City,State)___] : The provider was located at [unfilled]. [Home] : The patient, [unfilled], was located at home, [unfilled], at the time of the visit. [Verbal consent obtained from patient/other participant(s)] : Verbal consent for telehealth/telephonic services obtained from patient/other participant(s) [Patient] : Patient [FreeTextEntry4] : 10:45am [FreeTextEntry5] : 11:30am [FreeTextEntry1] : Individual therapy for patient's mental health

## 2024-04-03 ENCOUNTER — APPOINTMENT (OUTPATIENT)
Dept: PSYCHIATRY | Facility: CLINIC | Age: 34
End: 2024-04-03

## 2024-04-08 NOTE — REASON FOR VISIT
[Patient preference] : as per patient preference [Telehealth (audio & video) - Individual/Group] : This visit was provided via telehealth using real-time 2-way audio visual technology. [Other Location: e.g. Home (Enter Location, City,State)___] : The provider was located at [unfilled]. [Home] : The patient, [unfilled], was located at home, [unfilled], at the time of the visit. [Verbal consent obtained from patient/other participant(s)] : Verbal consent for telehealth/telephonic services obtained from patient/other participant(s) [Patient] : Patient [FreeTextEntry4] : 11:30am [FreeTextEntry5] : 12:20am [FreeTextEntry1] : Individual therapy for patient's mental health

## 2024-04-08 NOTE — END OF VISIT
[Duration of Psychotherapy Visit (minutes spent in synchronous communication): ____] : Duration of Psychotherapy Visit (minutes spent in synchronous communication): [unfilled] [Individual Psychotherapy for 38-52 minutes] : Individual Psychotherapy for 38 - 52 minutes [Teletherapy Service Provided] : The services provided in this session were delivered via tele-therapy [FreeTextEntry3] : Home in [place], [place] [FreeTextEntry5] : Home in [place], [place] [FreeTextEntry1] : [employer] Ophelia Patel

## 2024-04-08 NOTE — PLAN
[Integrative Therapy] : Integrative Therapy  [Psychodynamic Therapy] : Psychodynamic Therapy  [Recommended Frequency of Visits: ____] : Recommended frequency of visits: [unfilled] [Return in ____ week(s)] : Return in [unfilled] week(s) [FreeTextEntry2] : Goal 1: Manage interpersonal conflict so that her baby will grow up in a more solid family-unit  Objective A: Strategies for managing difficult emotions  Objective B: Strategies for enhanced communication  Goal 2: Decrease guilt and anxiety that comes with raising a baby Objective A: Explore how patient's upbringing is influencing her presently  Objective B: Increase insight around feelings and defenses Objective C: Experiential processing of positive and difficult emotions   [de-identified] : Patient arrived to the zoom session on-time. Patient reported feeling exhausted because her baby is sick again. Patient discussed ongoing interpersonal struggles. Writer asked open-ended questions about how the patient was feeling through these struggles. Patient felt annoyed and frustrated but felt unable to express this to them as she was trying to avoid conflict. Writer gave the patient space to discuss her feelings and opinions in session without having to censor herself. Patient was able to do so and reflect upon the relationships that have evolved in a healthy way over the last year. Patient ended session in good behavioral and emotional control. [FreeTextEntry1] : 1 x wk psychotherapy with Ophelia Patel

## 2024-04-10 ENCOUNTER — APPOINTMENT (OUTPATIENT)
Dept: PSYCHIATRY | Facility: CLINIC | Age: 34
End: 2024-04-10

## 2024-04-11 ENCOUNTER — NON-APPOINTMENT (OUTPATIENT)
Age: 34
End: 2024-04-11

## 2024-04-11 DIAGNOSIS — F41.9 ANXIETY DISORDER, UNSPECIFIED: ICD-10-CM

## 2024-04-11 NOTE — REASON FOR VISIT
[Patient preference] : as per patient preference [Telehealth (audio & video) - Individual/Group] : This visit was provided via telehealth using real-time 2-way audio visual technology. [Other Location: e.g. Home (Enter Location, City,State)___] : The provider was located at [unfilled]. [Home] : The patient, [unfilled], was located at home, [unfilled], at the time of the visit. [Verbal consent obtained from patient/other participant(s)] : Verbal consent for telehealth/telephonic services obtained from patient/other participant(s) [Patient] : Patient [FreeTextEntry4] : 11:30am [FreeTextEntry5] : 12:15am [FreeTextEntry1] : Individual therapy for patient's mental health

## 2024-04-11 NOTE — PLAN
[Integrative Therapy] : Integrative Therapy  [Recommended Frequency of Visits: ____] : Recommended frequency of visits: [unfilled] [Return in ____ week(s)] : Return in [unfilled] week(s) [FreeTextEntry2] : Goal 1: Manage interpersonal conflict so that her baby will grow up in a more solid family-unit  Objective A: Strategies for managing difficult emotions  Objective B: Strategies for enhanced communication  Goal 2: Decrease guilt and anxiety that comes with raising a baby Objective A: Explore how patient's upbringing is influencing her presently  Objective B: Increase insight around feelings and defenses Objective C: Experiential processing of positive and difficult emotions   [de-identified] : Patient arrived to the zoom session on-time. Patient reported feeling exhausted and sick again. Writer inquired about how the patient was feeling about our upcoming termination. Patient said that it's a minor part of the stress wrapped up in the home purchase and her upcoming move. Writer asked open-ended questions to explore patient's stress. Patient discussed increased interpersonal conflict and diminished energy levels. Writer asked questions to encourage experiential processing of these emotions. Patient expressed fear of not having a therapist when she moves and wants to set up an appointment with a new clinician in [place]. Patient ended session in good behavioral and emotional control. [FreeTextEntry1] : 1 x wk psychotherapy with Ophelia Patel

## 2024-04-11 NOTE — END OF VISIT
[Duration of Psychotherapy Visit (minutes spent in synchronous communication): ____] : Duration of Psychotherapy Visit (minutes spent in synchronous communication): [unfilled] [Individual Psychotherapy for 38-52 minutes] : Individual Psychotherapy for 38 - 52 minutes [Teletherapy Service Provided] : The services provided in this session were delivered via tele-therapy [FreeTextEntry3] : Home in [place], [place] [FreeTextEntry5] : Home in [place], [place] [FreeTextEntry1] : [employer] Ophelia Patel

## 2024-04-17 ENCOUNTER — APPOINTMENT (OUTPATIENT)
Dept: PSYCHIATRY | Facility: CLINIC | Age: 34
End: 2024-04-17

## 2024-04-17 NOTE — END OF VISIT
[Duration of Psychotherapy Visit (minutes spent in synchronous communication): ____] : Duration of Psychotherapy Visit (minutes spent in synchronous communication): [unfilled] [Individual Psychotherapy for 38-52 minutes] : Individual Psychotherapy for 38 - 52 minutes [Teletherapy Service Provided] : The services provided in this session were delivered via tele-therapy [FreeTextEntry3] : Home in [place], [place] [FreeTextEntry5] : Home in [place], [place] [FreeTextEntry1] : [employer] Ophelia Patel

## 2024-04-17 NOTE — REASON FOR VISIT
[Patient preference] : as per patient preference [Telehealth (audio & video) - Individual/Group] : This visit was provided via telehealth using real-time 2-way audio visual technology. [Other Location: e.g. Home (Enter Location, City,State)___] : The provider was located at [unfilled]. [Home] : The patient, [unfilled], was located at home, [unfilled], at the time of the visit. [Verbal consent obtained from patient/other participant(s)] : Verbal consent for telehealth/telephonic services obtained from patient/other participant(s) [Patient] : Patient [FreeTextEntry4] : 11:30am [FreeTextEntry5] : 12:15pm [FreeTextEntry1] : Individual therapy for patient's mental health

## 2024-04-17 NOTE — PLAN
[Integrative Therapy] : Integrative Therapy  [FreeTextEntry2] : Goal 1: Manage interpersonal conflict so that her baby will grow up in a more solid family-unit  Objective A: Strategies for managing difficult emotions  Objective B: Strategies for enhanced communication  Goal 2: Decrease guilt and anxiety that comes with raising a baby Objective A: Explore how patient's upbringing is influencing her presently  Objective B: Increase insight around feelings and defenses Objective C: Experiential processing of positive and difficult emotions   [de-identified] : Patient arrived to the zoom session on-time. Patient confirmed that she made an appointment with a new provider in [place] for the first Friday in May. Writer asked open-ended questions about how the patient was feeling about this being  final session. Patient expressed changed opinions about what she shares with a trusted friend and said she is thankful to have someone to talk to in the interim. Patient also discussed how the stress of moving continues to impact her marriage in a novel way and she has a desire to find new ways of relating and communicating in big transition periods. Writer invited patient to experience the feelings under these situations. Patient said it is exhausting and she wants to minimize conflict situations in front of the baby. Writer asked questions to better understand her present coping mechanisms and defenses. Writer read a termination letter to the patient and  processed the feelings of ending together. Patient ended session in good behavioral and emotional control. [FreeTextEntry1] : Patient is being discharged from [facility] because she is moving to [place].

## 2024-04-18 ENCOUNTER — APPOINTMENT (OUTPATIENT)
Dept: PSYCHIATRY | Facility: CLINIC | Age: 34
End: 2024-04-18
Payer: COMMERCIAL

## 2024-04-18 DIAGNOSIS — F32.A ANXIETY DISORDER, UNSPECIFIED: ICD-10-CM

## 2024-04-18 DIAGNOSIS — F41.9 ANXIETY DISORDER, UNSPECIFIED: ICD-10-CM

## 2024-04-18 PROCEDURE — 99214 OFFICE O/P EST MOD 30 MIN: CPT | Mod: 95

## 2024-04-25 PROBLEM — F41.9 ANXIETY DISORDER, UNSPECIFIED: Status: ACTIVE | Noted: 2023-07-13

## 2024-04-25 RX ORDER — SERTRALINE HYDROCHLORIDE 100 MG/1
100 TABLET, FILM COATED ORAL
Qty: 30 | Refills: 1 | Status: ACTIVE | COMMUNITY
Start: 2022-10-03 | End: 1900-01-01

## 2024-04-25 NOTE — PLAN
[FreeTextEntry4] : Goals met and addressed. [FreeTextEntry5] : Will discharge from the clinic secondary to her move to [place].

## 2024-04-25 NOTE — RISK ASSESSMENT
[No, patient denies ideation or behavior] : No, patient denies ideation or behavior [Low acute suicide risk] : Low acute suicide risk [Yes] : Yes [FreeTextEntry8] : Pt with sx well-controlled, has some WNL response to stressor of moving and good coping skills, no evidence for heightened acuity at this time, no SI/I/P, pt denies HI/AH/VH/paranoia.  No evidence of aftab or psychosis.  Pt presents calm, cooperative, pleasant, with euthymic affect. Pt engaged in treatment and motivated to continue treatment with psychiatrist and therapist in [place] (has made appts).  At this time, she is at low acute risk of harm to self/others and stable for continued outpatient care in [place]. [FreeTextEntry9] : Low acute risk of harm to self/others at this time and stable for continued outpatient care to be transitioned to [place]. [FreeTextEntry2] : given PPH: h/o passive SI, cutting, FH, etc.

## 2024-04-25 NOTE — DISCUSSION/SUMMARY
[FreeTextEntry1] : At time of intake: 32 year old cis-gendered, heterosexual Nepalese American female, lives with her  in [place], works as  at [employer], currently on leave 2' being 23 wks pregnant, due 2023, PPH of depression, anxiety, ADHD, h/o cutting in high school, in therapy since age 5 on and off, numerous medication trials, specifically Zoloft and Paxil for anxiety, restarted on Zoloft 50mg at beginning of pregnancy for anxiety, denies substance abuse, h/o smoking, referred by OBGYN, presents for assessment and treatment of increased anxiety during pregnancy.  Pt found out she was pregnant in 2022. This is her first pregnancy, no children, no FH of  depression or anxiety, planned pregnancy, on ST disability secondary to morning sickness, happy to be growing a child. Plans to breastfeed. Unsure if she will cont to work after pregnancy. In-laws will help with childcare. Last gyn exam was 2 wks. LMP 3/30, FMP age 14.  She describes feelings of apathy, amotivation, catastrophizing, frequent crying spells, excessive worries, ruminations, restlessness, and guilt about fears of negatively impacting her pregnancy. She states anxiety comes daily but is not constant. No panic attacks, last PA was 4 years ago. Denies SI/HI/AH/VH. Denies h/o manic episodes, denies hypersexuality, increased spending, decreased need for sleep, impulsive behaviors. Presents with euthymic mood.  PPH - SIB (cutting) in high school, has written suicide letters and had pills ready when in high school. Since then no attempts, preparatory acts or SIB. Aggressive behaviors in middle school, states didn't know how to show affection or love at that time. No aggressive behaviors since then. Therapy since age 5, from [age] longest, on Zoloft, then Paxil for anxiety. From [age], -23, numerous medication trials including Sapphris, Wellbutrin (weight gain), Klonopin, Xanax, Vyvanse, Adderall (weight loss), propranolol, Paxil, Zoloft. Has been treated for anxiety, depression, ADHD ( based off of restlessness, low concentration, energy, and trouble sleeping) and bipolar dx (based off "slut phase at age 20 - One prior provider suggested she may have bipolar disorder in the past. Patient denies s/s of prior manic episodes and states she doesn't think she has it.) No meds since age 25. Restarted therapy at beginning of pregnancy. Denies past psychiatric hospitalizations.  SUBS - alcohol once a year, none during pregnancy, h/o smoking age [age], pack a day, not smoking now.  MEDS- Zoloft 50mg daily since 2022 during pregnancy, prescribed by OBGYN. No side effects.  ALL- soy  FH - coiusin with anxiety/depression, no FH of suicide, biological father abused opiates.  SH- lives with , college - BS in [employer] science, identifies as Bahai  American, raised in [place], no developmental delays, public school, As & Bs, no learning disabilities noted, parents  when she was 3years old, moved around a lot with various family members, stepfather involved in life, close to him, limited interactions with her biological father, childhood neglect when with her father, mother was emotionally critical, close to her maternal extended family, close to cousin.  is most supportive relationship she's ever had. Losses - parental divorce, loss of childhood friend at age 9, loss of grandparent at age 19, grandmother last year from dementia. 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  RISK - Low acute risk of self harm. Protected by responsibilities to family, support network, in therapy, reasons for living, future-oriented ,motivated for treatment. Denies si/hi/ah/vh, future oriented, euthumic affect.  DDX - anxiety, , r/o bipolar d/o  PLAN - wants to transition care to [facility] treatment team  Patient gives verbal consent to speak with patient's . Nam Rodriguez [phone],  less than a year, known him for 7.5 years.  Anxiety is much more manageable.  Patient admitted to clinic -  CLINIC COURSE: Patient transitions her care to [facility], [facility] clinicians. She began treatment with therapist. She has been taking sertraline 50mg qhs with good effect since Aug 2022 and would like to continue it going forward. RBA of medications discussed with patient. Describes anxiety as improving with brief, infrequent episodes of anxiety. Denies SI/HI/AH/VH. No evidence of aftab or psychosis.  Had baby on 22, baby boy. Jadiel. Traumatic delivery, had to do emergent , which she wasn't prepared for. Baby had to go NICU so delayed breast feeding. Feeling emotionally unstable. Crying often. Loves him, finds him to be really cute. Baby sleeps ok, takes turns with . Denies SI/HI/AH/VH. Denies thoughts of harming the baby.  feels that she gets anxious and cries often, but happy that they're doing a good job with the milk and taking care of the baby. Continuing therapy with Rebeca. Continues to take Zoloft 50mg daily, denies side effects. States she just got a refill and doesn't need rx today.Patient agreeable to increasing Zoloft to 75mg with plan to furhter increase if tolerated given hisotry of nausea.will f/u in 1 week. 23 - improving on on Zoloft 75mg. 1/10/23 zoloft increased to 100mg to better address low mood. Patient improving on zoloft 100mg d, no longer having thoughts of wanting to be dead. 23 - stable on Zoloft 100mg daily, doing well. Denies SI/HI/AH/VH. No evidence of psycohsis or aftab.  - stable, doing well, denies si/hi/ah/vh, no thoughts of harming baby, euthymic affect. Patient misses next appt and then does not return to see undersigned until 2023 stating that she had received refills from her OB and had continued to take meds throughout without side effects. She remains stable, denies si/hi/ah/vh, denies thoughts of harming baby, future-oriented. Baby is 4 mo old. Pt aware of undersigned's coming leave and expects to f/u with coverage.    23- pt with passive SI several times/wk, working with therapist, pt very insightful and able to manage, agrees we keep rx stable at this time as job stress may be contributing factor that is expected to resolve, not in MDE, no sx c/w elevated/mixed episode or psychosis, agrees we f/u in 4wks, she will make sooner appt if needed, schedules f/u  at 9:30am, has [facility] clinic contact info.  23- sx stable, some situational frustration with going back to work, pt not in mood episode or with uncontrolled sx, no evidence for acute risk, continue current plan, pt happy with plan  23- pt reports no significant changes, still has some depressive sx but controlled, keep Rx stable at this time, pt aware we can meet sooner if needed (transitioning therapists at [facility]), no SI/HI  23- sx stable, somewhat improved, pt seeking therapy, no med changes indicated, pt happy with plan  10/11/23- overall doing well, beginning therapy this week [facility], no indication for med change at this time or evidence for heightened risk  23- sx stable, pt with intermittent URIs, no interim suicidal thoughts, no issues with zoloft, f/u 1mo  23- pt at baseline, no SI, has unused zoloft refill, would like to further discuss attention sx in future, no gross impairment  1/3/24- pt ill with COVID but functioning, somatic mood sx likely 2/2 illness, no indication for rx change at this time. Future oriented and hopeful.  24- pt stable, therapy going well, no indication for rx changes at this time.  3/18/2024 - transitioned care back to [facility]).  pt doing well, stable mood, compliant and tolerating sertraline 100mg daily.  excited about new house and coming move to [place] in early may.  planning to transition care to [place] providers. will notify therapist as well  24 - pt stable, doing well, excited about upcoming move to [place].  has made appts with providers in [place].  pt denies si/hi/ah/vh, no evidence of pscyhosis or aftab.  no evidence of mood disturbance/anxiety.  stable for transition to outpatient providers in [place].  -supportive therapy provided -continue zoloft 100mg PO daily.  renewed meds with 1 refill to assist during the transition. -pt to f/u with therapist and psychiatrist in [place] next month -pt to call 911 or go to the nearest emergency room in case of emergency -will discharge from the clinic

## 2024-04-25 NOTE — PHYSICAL EXAM
[Well groomed] : well groomed [Average] : average [Cooperative] : cooperative [Euthymic] : euthymic [Full] : full [Clear] : clear [Linear/Goal Directed] : linear/goal directed [None] : none [None Reported] : none reported [Above average] : above average [WNL] : within normal limits [FreeTextEntry5] : telehaleyer [FreeTextEntry1] : well-related [FreeTextEntry8] : happy [de-identified] : cheerful

## 2024-04-25 NOTE — HISTORY OF PRESENT ILLNESS
[FreeTextEntry1] : Patient seen and assessed.  Chart reviewed.  Currently in [place] apartment.  Things in new [place] house were in worse shape than expected so there may be a delay in the move to early May; she continues to look forward to the move.  Has an appt with a psychiatrist and a therapist on May 6/8 at [facility]. Pt denies changes in sleep, appetite, or energy.  Describes good mood.  Has been taking care of herself and her baby.  Low levels of anxiety re: move, otherwise feeling well.  Pt denies si/hi/ah/vh/paranoia.  No evidence of psychosis or aftab.  Pt is calm, cooperative and future-oriented.  Compliant with sertraline 100mg daily, no side effects. Will renew meds with 1 refill as she transitions to her new providers in [place].  She remains stable with low acute risk of harm to self or others, will discharge from the clinic today.

## 2024-04-25 NOTE — REASON FOR VISIT
[Number can be texted] : number can be texted [OK  to leave message] : OK  to leave message [Other:___] : [unfilled] [FreeTextEntry5] : English [FreeTextEntry6] : Angelika  [FreeTextEntry9] : 09/06/22 [FreeTextEntry8] : (04/18/24) [FreeTextEntry1] : Patient is being discharged from [facility] because she is moving to [place]. She has set up appointments to transition her individual therapy and medication management to [place].  [FreeTextEntry2] : Patient sought services for her postpartum mood and anxiety disorder

## 2024-04-25 NOTE — DISCUSSION/SUMMARY
[FreeTextEntry1] : At time of intake: 32 year old cis-gendered, heterosexual [place]ian American female, lives with her  in [place], works as  at [employer], currently on leave 2' being 23 wks pregnant, due 2023, PPH of depression, anxiety, ADHD, h/o cutting in high school, in therapy since age 5 on and off, numerous medication trials, specifically Zoloft and Paxil for anxiety, restarted on Zoloft 50mg at beginning of pregnancy for anxiety, denies substance abuse, h/o smoking, referred by OBGYN, presents for assessment and treatment of increased anxiety during pregnancy.  Pt found out she was pregnant in 2022. This is her first pregnancy, no children, no FH of  depression or anxiety, planned pregnancy, on ST disability secondary to morning sickness, happy to be growing a child. Plans to breastfeed. Unsure if she will cont to work after pregnancy. In-laws will help with childcare. Last gyn exam was 2 wks. LMP 3/30, FMP age 14.  She describes feelings of apathy, amotivation, catastrophizing, frequent crying spells, excessive worries, ruminations, restlessness, and guilt about fears of negatively impacting her pregnancy. She states anxiety comes daily but is not constant. No panic attacks, last PA was 4 years ago. Denies SI/HI/AH/VH. Denies h/o manic episodes, denies hypersexuality, increased spending, decreased need for sleep, impulsive behaviors. Presents with euthymic mood.  PPH - SIB (cutting) in high school, has written suicide letters and had pills ready when in high school. Since then no attempts, preparatory acts or SIB. Aggressive behaviors in middle school, states didn't know how to show affection or love at that time. No aggressive behaviors since then. Therapy since age 5, from [age] longest, on Zoloft, then Paxil for anxiety. From [age], -23, numerous medication trials including Sapphris, Wellbutrin (weight gain), Klonopin, Xanax, Vyvanse, Adderall (weight loss), propranolol, Paxil, Zoloft. Has been treated for anxiety, depression, ADHD ( based off of restlessness, low concentration, energy, and trouble sleeping) and bipolar dx (based off "slut phase at age 20 - One prior provider suggested she may have bipolar disorder in the past. Patient denies s/s of prior manic episodes and states she doesn't think she has it.) No meds since age 25. Restarted therapy at beginning of pregnancy. Denies past psychiatric hospitalizations.  SUBS - alcohol once a year, none during pregnancy, h/o smoking age [age], pack a day, not smoking now.  MEDS- Zoloft 50mg daily since 2022 during pregnancy, prescribed by OBGYN. No side effects.  ALL- soy  FH - coiusin with anxiety/depression, no FH of suicide, biological father abused opiates.  SH- lives with , college - BS in [email or web address] science, identifies as Baptist  American, raised in [place], no developmental delays, public school, As & Bs, no learning disabilities noted, parents  when she was 3years old, moved around a lot with various family members, stepfather involved in life, close to him, limited interactions with her biological father, childhood neglect when with her father, mother was emotionally critical, close to her maternal extended family, close to cousin.  is most supportive relationship she's ever had. Losses - parental divorce, loss of childhood friend at age 9, loss of grandparent at age 19, grandmother last year from dementia. 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  RISK - Low acute risk of self harm. Protected by responsibilities to family, support network, in therapy, reasons for living, future-oriented ,motivated for treatment. Denies si/hi/ah/vh, future oriented, euthumic affect.  DDX - anxiety, , r/o bipolar d/o  PLAN - wants to transition care to [facility] treatment team  Patient gives verbal consent to speak with patient's . Nam Rodriguez [phone],  less than a year, known him for 7.5 years.  Anxiety is much more manageable.  Patient admitted to clinic -  CLINIC COURSE: Patient transitions her care to [facility], [facility] clinicians. She began treatment with therapist. She has been taking sertraline 50mg qhs with good effect since Aug 2022 and would like to continue it going forward. RBA of medications discussed with patient. Describes anxiety as improving with brief, infrequent episodes of anxiety. Denies SI/HI/AH/VH. No evidence of aftab or psychosis.  Had baby on 22, baby boy. Jadiel. Traumatic delivery, had to do emergent , which she wasn't prepared for. Baby had to go NICU so delayed breast feeding. Feeling emotionally unstable. Crying often. Loves him, finds him to be really cute. Baby sleeps ok, takes turns with . Denies SI/HI/AH/VH. Denies thoughts of harming the baby.  feels that she gets anxious and cries often, but happy that they're doing a good job with the milk and taking care of the baby. Continuing therapy with Rebeca. Continues to take Zoloft 50mg daily, denies side effects. States she just got a refill and doesn't need rx today.Patient agreeable to increasing Zoloft to 75mg with plan to furhter increase if tolerated given hisotry of nausea.will f/u in 1 week. 23 - improving on on Zoloft 75mg. 1/10/23 zoloft increased to 100mg to better address low mood. Patient improving on zoloft 100mg d, no longer having thoughts of wanting to be dead. 23 - stable on Zoloft 100mg daily, doing well. Denies SI/HI/AH/VH. No evidence of psycohsis or aftab.  - stable, doing well, denies si/hi/ah/vh, no thoughts of harming baby, euthymic affect. Patient misses next appt and then does not return to see undersigned until 2023 stating that she had received refills from her OB and had continued to take meds throughout without side effects. She remains stable, denies si/hi/ah/vh, denies thoughts of harming baby, future-oriented. Baby is 4 mo old. Pt aware of undersigned's coming leave and expects to f/u with coverage.    23- pt with passive SI several times/wk, working with therapist, pt very insightful and able to manage, agrees we keep rx stable at this time as job stress may be contributing factor that is expected to resolve, not in MDE, no sx c/w elevated/mixed episode or psychosis, agrees we f/u in 4wks, she will make sooner appt if needed, schedules f/u  at 9:30am, has [facility] clinic contact info.  23- sx stable, some situational frustration with going back to work, pt not in mood episode or with uncontrolled sx, no evidence for acute risk, continue current plan, pt happy with plan  23- pt reports no significant changes, still has some depressive sx but controlled, keep Rx stable at this time, pt aware we can meet sooner if needed (transitioning therapists at [facility]), no SI/HI  23- sx stable, somewhat improved, pt seeking therapy, no med changes indicated, pt happy with plan  10/11/23- overall doing well, beginning therapy this week [facility], no indication for med change at this time or evidence for heightened risk  23- sx stable, pt with intermittent URIs, no interim suicidal thoughts, no issues with zoloft, f/u 1mo  23- pt at baseline, no SI, has unused zoloft refill, would like to further discuss attention sx in future, no gross impairment  1/3/24- pt ill with COVID but functioning, somatic mood sx likely 2/2 illness, no indication for rx change at this time. Future oriented and hopeful.  24- pt stable, therapy going well, no indication for rx changes at this time.  3/18/2024 - transitioned care back to [facility]).  pt doing well, stable mood, compliant and tolerating sertraline 100mg daily.  excited about new house and coming move to [place] in early may.  planning to transition care to [place] providers. will notify therapist as well  24 - pt stable, doing well, excited about upcoming move to [place].  has made appts with providers in [place].  pt denies si/hi/ah/vh, no evidence of pscyhosis or aftab.  no evidence of mood disturbance/anxiety.  stable for transition to outpatient providers in [place].  -supportive therapy provided -continue zoloft 100mg PO daily.  renewed meds with 1 refill to assist during the transition. -pt to f/u with therapist and psychiatrist in [place] next month -pt to call 911 or go to the nearest emergency room in case of emergency -will discharge from the clinic

## 2024-04-25 NOTE — PLAN
[Patient provided resources for further treatment (provide details):___] : Patient provided resources for further treatment:[unfilled] [de-identified] : Writer offered to assist patient in finding new services in [place]. Patient wanted to continue using our sessions for psychotherapy and found  new providers (therapist and psychiatrist) on her own time.

## 2024-04-25 NOTE — TREATMENT
[de-identified] : See Clinical Summary. [FreeTextEntry8] : See Clinical Summary.  Sertraline 100mg daily with good effect. [de-identified] : Writer used an integrative approach to support the patient in experiencing all parts of herself, processing past and present experiences, as she transitioned into her new role as a mother.  [de-identified] : Had a baby 12/2022 [de-identified] : Patient has made significant progress with treatment goals. Patient's postpartum depression and anxiety have significantly improved. When anxiety does arise, patient utilizes her coping mechanisms to manage the stress. Patient and writer have also come up with a various strategies to improve her interpersonal relationships while also not abandoning her own wishes and desires.  [de-identified] : Patient's overall goal for treatment was to, "Work through past and present experiences to help ensure I raise my child the way I want to". Patient would often discuss interpersonal struggles in therapy in order to talk through the issues and feel better equipped to decide whether or not they were worth addressing with the source. Patient and writer worked together to consider different perspectives and created boundaries to protect her time and energy. Patient and writer also engaged in unpacking the patient's childhood to gain more insight into her feelings around raising her own child. Patient has made significant progress towards her goals, but will continue individual therapy in [place].

## 2024-04-25 NOTE — REASON FOR VISIT
[Patient preference] : as per patient preference [Telehealth (audio & video) - Individual/Group] : This visit was provided via telehealth using real-time 2-way audio visual technology. [Other Location: e.g. Home (Enter Location, City,State)___] : The provider was located at [unfilled]. [Home] : The patient, [unfilled], was located at home, [unfilled], at the time of the visit. [Verbal consent obtained from patient/other participant(s)] : Verbal consent for telehealth/telephonic services obtained from patient/other participant(s) [Patient] : Patient [FreeTextEntry1] : termination visit

## 2024-04-25 NOTE — HISTORY OF PRESENT ILLNESS
[FreeTextEntry1] : See Clinical Summary.  Patient initially came in for treatment for her postpartum mood disorder. She often had thoughts of dying, "Maybe they would all be better off without me". Her illness was severe at the start, but significantly decreased over the course of time and treatment. Now, she no longer experiences postpartum mood disorders and she manages her chronic depression and anxiety with therapy, medication, and other outside coping mechanisms.

## 2024-07-09 NOTE — ED ADULT NURSE NOTE - PATIENT IS UNABLE TO BE SCREENED DUE TO:
Patient was transferred to 3rd floor on bipap. Current settings 18/7 R 20 30%. Patient remains on bipap for WOB/SOB. The bridge of the nose looks good and remains intact. Pt is tolerating it well. Will continue to monitor and assess the pt's current respiratory status and needs  RT Candido on 7/9/2024 at 5:34 PM    Patient refused
